# Patient Record
Sex: FEMALE | Race: WHITE | Employment: FULL TIME | URBAN - NONMETROPOLITAN AREA
[De-identification: names, ages, dates, MRNs, and addresses within clinical notes are randomized per-mention and may not be internally consistent; named-entity substitution may affect disease eponyms.]

---

## 2017-05-19 ENCOUNTER — OFFICE VISIT (OUTPATIENT)
Dept: URGENT CARE | Age: 27
End: 2017-05-19

## 2017-05-19 VITALS
SYSTOLIC BLOOD PRESSURE: 158 MMHG | DIASTOLIC BLOOD PRESSURE: 94 MMHG | HEART RATE: 110 BPM | HEIGHT: 64 IN | TEMPERATURE: 98.7 F | OXYGEN SATURATION: 99 % | BODY MASS INDEX: 26.8 KG/M2 | WEIGHT: 157 LBS | RESPIRATION RATE: 20 BRPM

## 2017-05-19 DIAGNOSIS — J20.9 ACUTE BRONCHITIS, UNSPECIFIED ORGANISM: Primary | ICD-10-CM

## 2017-05-19 PROCEDURE — 96372 THER/PROPH/DIAG INJ SC/IM: CPT | Performed by: PHYSICIAN ASSISTANT

## 2017-05-19 PROCEDURE — 99202 OFFICE O/P NEW SF 15 MIN: CPT | Performed by: PHYSICIAN ASSISTANT

## 2017-05-19 RX ORDER — LEVOFLOXACIN 500 MG/1
500 TABLET, FILM COATED ORAL DAILY
Qty: 10 TABLET | Refills: 0 | Status: SHIPPED | OUTPATIENT
Start: 2017-05-19 | End: 2017-05-29

## 2017-05-19 RX ORDER — DEXAMETHASONE SODIUM PHOSPHATE 10 MG/ML
10 INJECTION INTRAMUSCULAR; INTRAVENOUS ONCE
Status: COMPLETED | OUTPATIENT
Start: 2017-05-19 | End: 2017-05-19

## 2017-05-19 RX ORDER — LISINOPRIL 5 MG/1
5 TABLET ORAL DAILY
Qty: 30 TABLET | Refills: 0 | Status: SHIPPED | OUTPATIENT
Start: 2017-05-19 | End: 2018-10-24 | Stop reason: SDUPTHER

## 2017-05-19 RX ORDER — ALBUTEROL SULFATE 90 UG/1
2 AEROSOL, METERED RESPIRATORY (INHALATION) EVERY 6 HOURS PRN
Qty: 1 INHALER | Refills: 3 | Status: SHIPPED | OUTPATIENT
Start: 2017-05-19

## 2017-05-19 RX ADMIN — DEXAMETHASONE SODIUM PHOSPHATE 10 MG: 10 INJECTION INTRAMUSCULAR; INTRAVENOUS at 15:54

## 2017-05-19 ASSESSMENT — ENCOUNTER SYMPTOMS
SINUS PRESSURE: 1
NAUSEA: 0
DIARRHEA: 0
WHEEZING: 0
ABDOMINAL PAIN: 0
SORE THROAT: 0
RHINORRHEA: 1
SHORTNESS OF BREATH: 0
VOMITING: 0
COUGH: 1

## 2018-10-24 ENCOUNTER — OFFICE VISIT (OUTPATIENT)
Dept: URGENT CARE | Age: 28
End: 2018-10-24

## 2018-10-24 VITALS
OXYGEN SATURATION: 96 % | WEIGHT: 164 LBS | HEART RATE: 103 BPM | DIASTOLIC BLOOD PRESSURE: 82 MMHG | BODY MASS INDEX: 28.15 KG/M2 | RESPIRATION RATE: 18 BRPM | SYSTOLIC BLOOD PRESSURE: 128 MMHG

## 2018-10-24 DIAGNOSIS — I10 ESSENTIAL HYPERTENSION: Primary | ICD-10-CM

## 2018-10-24 PROCEDURE — 99213 OFFICE O/P EST LOW 20 MIN: CPT | Performed by: NURSE PRACTITIONER

## 2018-10-24 RX ORDER — LISINOPRIL 5 MG/1
5 TABLET ORAL DAILY
Qty: 30 TABLET | Refills: 0 | Status: SHIPPED | OUTPATIENT
Start: 2018-10-24 | End: 2021-11-02

## 2018-10-24 ASSESSMENT — ENCOUNTER SYMPTOMS
SINUS PRESSURE: 0
CONSTIPATION: 0
EYE REDNESS: 0
GASTROINTESTINAL NEGATIVE: 1
WHEEZING: 0
RESPIRATORY NEGATIVE: 1
TROUBLE SWALLOWING: 0
SORE THROAT: 0
ALLERGIC/IMMUNOLOGIC NEGATIVE: 1
SHORTNESS OF BREATH: 0
ABDOMINAL DISTENTION: 0
EYES NEGATIVE: 1
ABDOMINAL PAIN: 0

## 2018-10-24 NOTE — PROGRESS NOTES
and intact. No cyanosis. Psychiatric: She has a normal mood and affect. Her behavior is normal. Judgment and thought content normal.   Nursing note and vitals reviewed. /82   Pulse 103   Resp 18   Wt 164 lb (74.4 kg)   SpO2 96%   BMI 28.15 kg/m²     Assessment:       Diagnosis Orders   1. Essential hypertension  lisinopril (PRINIVIL;ZESTRIL) 5 MG tablet       Plan:    No orders of the defined types were placed in this encounter. Return if symptoms worsen or fail to improve. No orders of the defined types were placed in this encounter. Orders Placed This Encounter   Medications    lisinopril (PRINIVIL;ZESTRIL) 5 MG tablet     Sig: Take 1 tablet by mouth daily     Dispense:  30 tablet     Refill:  0       Patient given educationalmaterials - see patient instructions. Discussed use, benefit, and side effectsof prescribed medications. All patient questions answered. Pt voiced understanding. Reviewed health maintenance. Instructed to continue current medications, diet andexercise. Patient agreed with treatment plan. Follow up as directed. Patient Instructions   1. Take medication as directed  2. Follow up with PCP on Oct 30  3.  Return to clinic as needed        Electronically signed by DONATO Arias CNP on 10/24/2018 at 6:28 PM

## 2019-01-21 ENCOUNTER — OFFICE VISIT (OUTPATIENT)
Dept: URGENT CARE | Age: 29
End: 2019-01-21

## 2019-01-21 VITALS
WEIGHT: 167.6 LBS | TEMPERATURE: 99 F | DIASTOLIC BLOOD PRESSURE: 100 MMHG | SYSTOLIC BLOOD PRESSURE: 166 MMHG | HEART RATE: 122 BPM | HEIGHT: 64 IN | OXYGEN SATURATION: 98 % | RESPIRATION RATE: 18 BRPM | BODY MASS INDEX: 28.61 KG/M2

## 2019-01-21 DIAGNOSIS — H65.06 RECURRENT ACUTE SEROUS OTITIS MEDIA OF BOTH EARS: Primary | ICD-10-CM

## 2019-01-21 PROCEDURE — 99213 OFFICE O/P EST LOW 20 MIN: CPT | Performed by: NURSE PRACTITIONER

## 2019-01-21 RX ORDER — FLUTICASONE PROPIONATE 50 MCG
2 SPRAY, SUSPENSION (ML) NASAL DAILY
Qty: 3 BOTTLE | Refills: 1 | Status: SHIPPED | OUTPATIENT
Start: 2019-01-21

## 2019-01-21 RX ORDER — AZITHROMYCIN 250 MG/1
250 TABLET, FILM COATED ORAL SEE ADMIN INSTRUCTIONS
Qty: 6 TABLET | Refills: 0 | Status: SHIPPED | OUTPATIENT
Start: 2019-01-21 | End: 2019-01-26

## 2019-01-21 ASSESSMENT — ENCOUNTER SYMPTOMS
RESPIRATORY NEGATIVE: 1
ABDOMINAL PAIN: 0
WHEEZING: 0
SINUS PRESSURE: 0
COUGH: 0
SORE THROAT: 0
VOMITING: 0
ALLERGIC/IMMUNOLOGIC NEGATIVE: 1
CONSTIPATION: 0
EYES NEGATIVE: 1
TROUBLE SWALLOWING: 0
SHORTNESS OF BREATH: 0
EYE REDNESS: 0
RHINORRHEA: 0
GASTROINTESTINAL NEGATIVE: 1
ABDOMINAL DISTENTION: 0

## 2021-10-30 ENCOUNTER — OFFICE VISIT (OUTPATIENT)
Dept: URGENT CARE | Age: 31
End: 2021-10-30
Payer: COMMERCIAL

## 2021-10-30 VITALS
HEIGHT: 63 IN | WEIGHT: 138 LBS | HEART RATE: 138 BPM | OXYGEN SATURATION: 100 % | BODY MASS INDEX: 24.45 KG/M2 | SYSTOLIC BLOOD PRESSURE: 150 MMHG | DIASTOLIC BLOOD PRESSURE: 99 MMHG | TEMPERATURE: 98.3 F

## 2021-10-30 DIAGNOSIS — I88.9 ADENITIS: Primary | ICD-10-CM

## 2021-10-30 DIAGNOSIS — F41.9 ANXIETY: ICD-10-CM

## 2021-10-30 DIAGNOSIS — J02.9 SORE THROAT: ICD-10-CM

## 2021-10-30 LAB — S PYO AG THROAT QL: NORMAL

## 2021-10-30 PROCEDURE — 87880 STREP A ASSAY W/OPTIC: CPT | Performed by: NURSE PRACTITIONER

## 2021-10-30 PROCEDURE — 99213 OFFICE O/P EST LOW 20 MIN: CPT | Performed by: NURSE PRACTITIONER

## 2021-10-30 RX ORDER — SULFAMETHOXAZOLE AND TRIMETHOPRIM 800; 160 MG/1; MG/1
1 TABLET ORAL 2 TIMES DAILY
Qty: 14 TABLET | Refills: 0 | Status: SHIPPED | OUTPATIENT
Start: 2021-10-30 | End: 2021-11-06

## 2021-10-30 ASSESSMENT — ENCOUNTER SYMPTOMS
CHEST TIGHTNESS: 0
COUGH: 0
ABDOMINAL PAIN: 0
NAUSEA: 0
DIARRHEA: 0
VOMITING: 0
SORE THROAT: 1
COLOR CHANGE: 0
SHORTNESS OF BREATH: 0

## 2021-10-30 NOTE — PROGRESS NOTES
ChiefComplaint:   Chief Complaint   Patient presents with    Pharyngitis     x1 week        History of Present Illness:  Finn Mireles is a 33 y.o. female who presents for evaluation of sore throat x 1 week. She reports the right side of her throat is tender to touch and it hurts to swallow. She reports a piece of something white, came out of her mouth earlier this week from the right side of her throat. Patient reports she has had trouble with her blood pressure for the last few months. She reports her anxiety has worsened and she feels this is due to her high stress job. She took hctz 12.5 mg and lisinopril 5 mg and her blood pressure bottomed out to 90/60 so she quit taking it. Since then, her blood pressure has been in the 120's/80's without concern. She reports she has white coat syndrome and that is why her blood pressure is elevated today. She also reports she has flushing in her neck, face and chest. She reports her right ear has been popping off and on for the last month. She denies any fevers but has a decreased appetite. History:  Past Medical History:   Diagnosis Date    Hypertension        No family history on file.   Social History     Socioeconomic History    Marital status:      Spouse name: Not on file    Number of children: Not on file    Years of education: Not on file    Highest education level: Not on file   Occupational History    Not on file   Tobacco Use    Smoking status: Never Smoker    Smokeless tobacco: Never Used   Vaping Use    Vaping Use: Never used   Substance and Sexual Activity    Alcohol use: No    Drug use: No    Sexual activity: Not on file   Other Topics Concern    Not on file   Social History Narrative    Not on file     Social Determinants of Health     Financial Resource Strain:     Difficulty of Paying Living Expenses:    Food Insecurity:     Worried About Running Out of Food in the Last Year:     [address] in the Last Year: Transportation Needs:     Lack of Transportation (Medical):  Lack of Transportation (Non-Medical):    Physical Activity:     Days of Exercise per Week:     Minutes of Exercise per Session:    Stress:     Feeling of Stress :    Social Connections:     Frequency of Communication with Friends and Family:     Frequency of Social Gatherings with Friends and Family:     Attends Scient[employer] Services:     Active Member of Clubs or Organizations:     Attends Club or Organization Meetings:     Marital Status:    Intimate Partner Violence:     Fear of Current or Ex-Partner:     Emotionally Abused:     Physically Abused:     Sexually Abused: Allergies: Allergies   Allergen Reactions    Amoxicillin     Erythromycin     Keflex [Cephalexin]     Pcn [Penicillins] Other (See Comments)     Unknown      Propranolol Other (See Comments)     Cant breathe    Zithromax [Azithromycin]        Medications:  Current Outpatient Medications on File Prior to Visit   Medication Sig Dispense Refill    Norethindrone (ORTHO MICRONOR PO) Take by mouth      fluticasone (FLONASE) 50 MCG/ACT nasal spray 2 sprays by Each Nare route daily (Patient not taking: Reported on 10/30/2021) 3 Bottle 1    lisinopril (PRINIVIL;ZESTRIL) 5 MG tablet Take 1 tablet by mouth daily (Patient not taking: Reported on 10/30/2021) 30 tablet 0    albuterol sulfate HFA (PROAIR HFA) 108 (90 BASE) MCG/ACT inhaler Inhale 2 puffs into the lungs every 6 hours as needed for Wheezing (Patient not taking: Reported on 10/30/2021) 1 Inhaler 3     No current facility-administered medications on file prior to visit. Review of Systems:  Review of Systems   Constitutional: Positive for appetite change and fatigue. Negative for activity change and fever. HENT: Positive for sore throat. Negative for congestion and ear pain. Respiratory: Negative for cough, chest tightness and shortness of breath. Cardiovascular: Negative for chest pain. sulfamethoxazole-trimethoprim (BACTRIM DS;SEPTRA DS) 800-160 MG per tablet; Take 1 tablet by mouth 2 times daily for 7 days  Dispense: 14 tablet; Refill: 0    2. Sore throat    - POCT rapid strep A     3. Anxiety  Encouraged patient to follow up with her primary care provider to further discuss anxiety and treatment. Return if symptoms worsen or fail to improve.

## 2021-11-02 ENCOUNTER — OFFICE VISIT (OUTPATIENT)
Dept: PRIMARY CARE CLINIC | Age: 31
End: 2021-11-02
Payer: COMMERCIAL

## 2021-11-02 VITALS
RESPIRATION RATE: 16 BRPM | DIASTOLIC BLOOD PRESSURE: 82 MMHG | BODY MASS INDEX: 23.82 KG/M2 | HEIGHT: 64 IN | TEMPERATURE: 97.7 F | HEART RATE: 89 BPM | WEIGHT: 139.5 LBS | OXYGEN SATURATION: 97 % | SYSTOLIC BLOOD PRESSURE: 154 MMHG

## 2021-11-02 DIAGNOSIS — F41.9 ANXIETY: Primary | ICD-10-CM

## 2021-11-02 DIAGNOSIS — I10 PRIMARY HYPERTENSION: ICD-10-CM

## 2021-11-02 PROCEDURE — 99204 OFFICE O/P NEW MOD 45 MIN: CPT | Performed by: FAMILY MEDICINE

## 2021-11-02 RX ORDER — LISINOPRIL 5 MG/1
5 TABLET ORAL DAILY
Qty: 30 TABLET | Refills: 5 | Status: SHIPPED | OUTPATIENT
Start: 2021-11-02

## 2021-11-02 RX ORDER — BUSPIRONE HYDROCHLORIDE 5 MG/1
5 TABLET ORAL 3 TIMES DAILY PRN
Qty: 90 TABLET | Refills: 0 | Status: SHIPPED | OUTPATIENT
Start: 2021-11-02 | End: 2021-12-02

## 2021-11-02 RX ORDER — CETIRIZINE HYDROCHLORIDE 10 MG/1
10 TABLET ORAL DAILY
COMMUNITY

## 2021-11-02 ASSESSMENT — PATIENT HEALTH QUESTIONNAIRE - PHQ9
SUM OF ALL RESPONSES TO PHQ9 QUESTIONS 1 & 2: 0
SUM OF ALL RESPONSES TO PHQ QUESTIONS 1-9: 0
2. FEELING DOWN, DEPRESSED OR HOPELESS: 0
1. LITTLE INTEREST OR PLEASURE IN DOING THINGS: 0

## 2021-11-03 ENCOUNTER — TELEPHONE (OUTPATIENT)
Dept: PRIMARY CARE CLINIC | Age: 31
End: 2021-11-03

## 2021-11-03 RX ORDER — CLINDAMYCIN HYDROCHLORIDE 300 MG/1
300 CAPSULE ORAL 2 TIMES DAILY
Qty: 14 CAPSULE | Refills: 0 | Status: SHIPPED | OUTPATIENT
Start: 2021-11-03 | End: 2021-11-10

## 2021-11-03 RX ORDER — PREDNISONE 10 MG/1
TABLET ORAL
Qty: 21 TABLET | Refills: 0 | Status: SHIPPED | OUTPATIENT
Start: 2021-11-03

## 2021-11-03 NOTE — TELEPHONE ENCOUNTER
Pt states she is on her 4th day of antibiotics and today after lunch she noticed that her throat down to her esophagus is swollen. She is finding it difficult to swallow and to breathe. She is wanting to know if she needs to changed antiinflammatories or antibiotics.

## 2021-11-03 NOTE — TELEPHONE ENCOUNTER
If she is having difficulty swallowing and breathing she needs to go to the ER immediately if she could be having an allergic reaction.

## 2021-11-03 NOTE — TELEPHONE ENCOUNTER
If it is just that one side that was bothering her before and she is not having difficulty breathing I could try sending in some steroids over and maybe switch her antibiotic to see if that would make a difference. Find out if she would like for me to do this and let me know and I will send it to the pharmacy.

## 2021-11-03 NOTE — TELEPHONE ENCOUNTER
Pt states it was only on the side with the infection. She states it is better now. She states she didn't have anything different or new for lunch.

## 2021-11-10 ASSESSMENT — ENCOUNTER SYMPTOMS
COLOR CHANGE: 0
ABDOMINAL PAIN: 0
VOMITING: 0
EYE DISCHARGE: 0
DIARRHEA: 0
BACK PAIN: 0
COUGH: 0
WHEEZING: 0
NAUSEA: 0

## 2021-11-10 NOTE — PROGRESS NOTES
SUBJECTIVE:    Patient ID: Finn Mireles is a 33 y.o. female. HPI:     Patient is seen today to establish care. She states that she thinks that she may have some anxiety going on. She states her blood pressures been running high at times and she feels like that that may be coming from anxiety. She states that she has been on blood pressure medication previously but she states that she started back on the medications that she was taking previously and her blood pressure bottomed out and she felt terrible so she is not sure that she needs blood pressure medication all the time. She states that she does not necessarily feel overly anxious all the time but does have occasional episodes where she gets stressed and will get anxious. She states that she is not having any chest pain but will occasionally have a few palpitations. She states that it is not anything that caused her to be symptomatic. She states that there is no family history of high blood pressure at an early age. She states that she does feel like she eats pretty healthy and drinks plenty of water during the day. Past Medical History:   Diagnosis Date    Hypertension      Current Outpatient Medications on File Prior to Visit   Medication Sig Dispense Refill    cetirizine (ZYRTEC ALLERGY) 10 MG tablet Take 10 mg by mouth daily      fluticasone (FLONASE) 50 MCG/ACT nasal spray 2 sprays by Each Nare route daily 3 Bottle 1    albuterol sulfate HFA (PROAIR HFA) 108 (90 BASE) MCG/ACT inhaler Inhale 2 puffs into the lungs every 6 hours as needed for Wheezing 1 Inhaler 3    Norethindrone (ORTHO MICRONOR PO) Take by mouth       No current facility-administered medications on file prior to visit.      Allergies   Allergen Reactions    Amoxicillin     Erythromycin     Keflex [Cephalexin]     Pcn [Penicillins] Other (See Comments)     Unknown      Propranolol Other (See Comments)     Cant breathe    Zithromax [Azithromycin]      Past Surgical History:   Procedure Laterality Date     SECTION  [id number]    X's 2     Family History   Problem Relation Age of Onset    High Blood Pressure Mother     Other Father         Cardiovascualr Disease    High Blood Pressure Father      Social History     Socioeconomic History    Marital status:      Spouse name: Not on file    Number of children: Not on file    Years of education: Not on file    Highest education level: Not on file   Occupational History    Not on file   Tobacco Use    Smoking status: Never Smoker    Smokeless tobacco: Never Used   Vaping Use    Vaping Use: Never used   Substance and Sexual Activity    Alcohol use: No    Drug use: No    Sexual activity: Not on file   Other Topics Concern    Not on file   Social History Narrative    Not on file     Social Determinants of Health     Financial Resource Strain:     Difficulty of Paying Living Expenses: Not on file   Food Insecurity:     Worried About Running Out of Food in the Last Year: Not on file    Nikole of Food in the Last Year: Not on file   Transportation Needs:     Lack of Transportation (Medical): Not on file    Lack of Transportation (Non-Medical):  Not on file   Physical Activity:     Days of Exercise per Week: Not on file    Minutes of Exercise per Session: Not on file   Stress:     Feeling of Stress : Not on file   Social Connections:     Frequency of Communication with Friends and Family: Not on file    Frequency of Social Gatherings with Friends and Family: Not on file    Attends Latter day Services: Not on file    Active Member of Clubs or Organizations: Not on file    Attends Club or Organization Meetings: Not on file    Marital Status: Not on file   Intimate Partner Violence:     Fear of Current or Ex-Partner: Not on file    Emotionally Abused: Not on file    Physically Abused: Not on file    Sexually Abused: Not on file   Housing Stability:     Unable to Pay for Housing in the Last Year: Not on file    Number of Places Lived in the Last Year: Not on file    Unstable Housing in the Last Year: Not on file       Review of Systems   Constitutional: Negative for activity change, appetite change and fever. HENT: Negative for congestion and nosebleeds. Eyes: Negative for discharge. Respiratory: Negative for cough and wheezing. Cardiovascular: Negative for chest pain and leg swelling. Gastrointestinal: Negative for abdominal pain, diarrhea, nausea and vomiting. Genitourinary: Negative for difficulty urinating, frequency and urgency. Musculoskeletal: Negative for back pain and gait problem. Skin: Negative for color change and rash. Neurological: Negative for dizziness and headaches. Hematological: Does not bruise/bleed easily. Psychiatric/Behavioral: Negative for sleep disturbance and suicidal ideas. The patient is nervous/anxious. OBJECTIVE:    Physical Exam  Vitals reviewed. Constitutional:       General: She is not in acute distress. Appearance: Normal appearance. She is well-developed. She is not diaphoretic. HENT:      Head: Normocephalic and atraumatic. Right Ear: External ear normal.      Left Ear: External ear normal.   Cardiovascular:      Rate and Rhythm: Normal rate and regular rhythm. Pulses: Normal pulses. Heart sounds: Normal heart sounds. No murmur heard. Pulmonary:      Effort: Pulmonary effort is normal. No respiratory distress. Breath sounds: Normal breath sounds. Abdominal:      General: Abdomen is flat. Bowel sounds are normal.      Palpations: Abdomen is soft. Tenderness: There is no abdominal tenderness. Musculoskeletal:      Cervical back: Normal range of motion and neck supple. Skin:     General: Skin is warm and dry. Neurological:      General: No focal deficit present. Mental Status: She is alert and oriented to person, place, and time. Mental status is at baseline.    Psychiatric:         Mood and Affect: Mood normal.         Behavior: Behavior normal.         Thought Content: Thought content normal.         Judgment: Judgment normal.        BP (!) 154/82   Pulse 89   Temp 97.7 °F (36.5 °C) (Temporal)   Resp 16   Ht 5' 4\" (1.626 m)   Wt 139 lb 8 oz (63.3 kg)   SpO2 97%   BMI 23.95 kg/m²      ASSESSMENT/PLAN:    Bhupinder Green was seen today for new patient, anxiety and hypertension. Diagnoses and all orders for this visit:    Anxiety    Primary hypertension    Other orders  -     lisinopril (PRINIVIL;ZESTRIL) 5 MG tablet; Take 1 tablet by mouth daily  -     busPIRone (BUSPAR) 5 MG tablet; Take 1 tablet by mouth 3 times daily as needed (anxiety)      Discussed with the patient today that I do feel like some of her symptoms may be stemming from anxiety so we are going to start BuSpar to see if this will help with the anxiety. I am also going to have her start back on 5 mg lisinopril without the hydrochlorothiazide and see how she does with this. I did discuss that she could also try doing a half a tablet of lisinopril if she would like if she feels like the 5 is too much. I would like to see her back in 4 weeks for recheck to see how she is doing. I would like for her to monitor her blood pressures at home and bring her log with her when she comes into the office. EMR Dragon/transcription disclaimer:  Much of this encounter note is electronictranscription/translation of spoken language to printed texts. The electronic translation of spoken language may be erroneous, or at times, nonsensical words or phrases may be inadvertently transcribed.   Although I havereviewed the note for such errors, some may still exist.

## 2021-12-30 ENCOUNTER — HOSPITAL ENCOUNTER (OUTPATIENT)
Dept: VASCULAR LAB | Age: 31
Discharge: HOME | End: 2021-12-30
Payer: COMMERCIAL

## 2021-12-30 DIAGNOSIS — M79.605 LEFT LEG PAIN: ICD-10-CM

## 2021-12-30 PROCEDURE — 93971 EXTREMITY STUDY: CPT

## 2022-01-03 ENCOUNTER — OFFICE VISIT (OUTPATIENT)
Dept: ENT CLINIC | Age: 32
End: 2022-01-03
Payer: COMMERCIAL

## 2022-01-03 VITALS — HEIGHT: 64 IN | WEIGHT: 126 LBS | BODY MASS INDEX: 21.51 KG/M2

## 2022-01-03 DIAGNOSIS — Z90.89 S/P TONSILLECTOMY: Primary | ICD-10-CM

## 2022-01-03 PROCEDURE — 99203 OFFICE O/P NEW LOW 30 MIN: CPT | Performed by: PHYSICIAN ASSISTANT

## 2022-01-03 RX ORDER — BUSPIRONE HYDROCHLORIDE 5 MG/1
10 TABLET ORAL 3 TIMES DAILY
COMMUNITY

## 2022-01-03 ASSESSMENT — ENCOUNTER SYMPTOMS
RHINORRHEA: 0
SINUS PRESSURE: 0
VOICE CHANGE: 0
SINUS PAIN: 0
EYE DISCHARGE: 0
EYE PAIN: 0
FACIAL SWELLING: 0
TROUBLE SWALLOWING: 0
SORE THROAT: 0

## 2022-01-03 NOTE — ASSESSMENT & PLAN NOTE
Doing well status post tonsillectomy 6 weeks ago with cauterization of postop hemorrhage to right tonsillar bed 3 weeks ago  Plan: I recommended compounded lidocaine lollipops for the discomfort. This was called into [address] She is to follow-up with me in 3 to 4 weeks for routine postop follow-up due to her surgeon living in [place]. She was advised that she can advance her diet to a regular diet starting next week.

## 2022-01-03 NOTE — PROGRESS NOTES
[facility] OTOLARYNGOLOGY/ENT  Obdulio Gutiérrez is a pleasant 78-year-old  female that was referred by Olimpia Sung due to a recent tonsillectomy that was performed in [place]. She reported that she is currently 3 weeks out from post cauterization of a postop bleed to the right tonsillar region. Since then she has had no issues and has been doing well. She reports that she had her surgery performed in [place] due to coverage as a . Currently she reports of soreness from the right tonsillar region and is having issues with pain control with Chloraseptic spray. She was given a trial of Magic mouthwash that has not helped. She denies any issues with bleeding is currently been maintained on a soft diet. Allergies: Amoxicillin, Erythromycin, Keflex [cephalexin], Pcn [penicillins], Propranolol, and Zithromax [azithromycin]      Current Outpatient Medications   Medication Sig Dispense Refill    busPIRone (BUSPAR) 5 MG tablet Take 5 mg by mouth 3 times daily      albuterol sulfate HFA (PROAIR HFA) 108 (90 BASE) MCG/ACT inhaler Inhale 2 puffs into the lungs every 6 hours as needed for Wheezing 1 Inhaler 3    Norethindrone (ORTHO MICRONOR PO) Take by mouth      predniSONE (DELTASONE) 10 MG tablet Take 6 tablets on day 1, 5 tablets on day 2, 4 tablets on day 3, 3 tablets on day 4, 2 tablets on day 5 and 1 tablet on day 6 (Patient not taking: Reported on 1/3/2022) 21 tablet 0    cetirizine (ZYRTEC ALLERGY) 10 MG tablet Take 10 mg by mouth daily (Patient not taking: Reported on 1/3/2022)      lisinopril (PRINIVIL;ZESTRIL) 5 MG tablet Take 1 tablet by mouth daily (Patient not taking: Reported on 1/3/2022) 30 tablet 5    fluticasone (FLONASE) 50 MCG/ACT nasal spray 2 sprays by Each Nare route daily (Patient not taking: Reported on 1/3/2022) 3 Bottle 1     No current facility-administered medications for this visit.        Past Surgical History:   Procedure Laterality Date     SECTION [id number]    X's 2       Past Medical History:   Diagnosis Date    Hypertension        Family History   Problem Relation Age of Onset    High Blood Pressure Mother     Other Father         Cardiovascualr Disease    High Blood Pressure Father        Social History     Tobacco Use    Smoking status: Never Smoker    Smokeless tobacco: Never Used   Substance Use Topics    Alcohol use: No           REVIEW OF SYSTEMS:  all other systems reviewed and are negative  Review of Systems   Constitutional: Negative for chills and fever. HENT: Negative for congestion, dental problem, ear discharge, ear pain, facial swelling, hearing loss, nosebleeds, postnasal drip, rhinorrhea, sinus pressure, sinus pain, sneezing, sore throat, tinnitus, trouble swallowing and voice change. Eyes: Negative for pain and discharge. Neurological: Negative for dizziness and headaches. Comments:     PHYSICAL EXAM:    Ht 5' 4\" (1.626 m)   Wt 126 lb (57.2 kg)   BMI 21.63 kg/m²   Body mass index is 21.63 kg/m². General Appearance: well developed  and well nourished  Head/ Face: normocephalic and atraumatic  Vocal Quality: good/ normal  Ears: Right Ear: External: external ears normal Otoscopy Ear Canal: canal clear Otoscopy TM: TM's normal and TM's mobile Left Ear: External: external ears normal Otoscopy Ear Canal: canal clear Otoscopy TM: TM's normal and TM's mobile  Hearing: grossly intact  Nose: nares normal and septum midline  Neck: supple and adenopathy none palpable  Thyroid: normal   Oral exam demonstrated the tonsillar bed to be healing well with no evidence of scabs noted be present physically to the right side. Granulation tissue was noted to be present with no purulence.      Assessment & Plan:    Problem List Items Addressed This Visit     S/P tonsillectomy - Primary     Doing well status post tonsillectomy 6 weeks ago with cauterization of postop hemorrhage to right tonsillar bed 3 weeks ago  Plan: I recommended compounded lidocaine lollipops for the discomfort. This was called into [address] She is to follow-up with me in 3 to 4 weeks for routine postop follow-up due to her surgeon living in [place]. She was advised that she can advance her diet to a regular diet starting next week. No orders of the defined types were placed in this encounter. No orders of the defined types were placed in this encounter. Electronically signed by Christopher Hernandez PA-C on 1/3/22 at 10:16 AM CST          Please note that this chart was generated using dragon dictation software. Although every effort was made to ensure the accuracy of this automated transcription, some errors in transcription may have occurred.

## 2022-01-28 ENCOUNTER — OFFICE VISIT (OUTPATIENT)
Dept: ENT CLINIC | Age: 32
End: 2022-01-28
Payer: COMMERCIAL

## 2022-01-28 VITALS — WEIGHT: 126 LBS | BODY MASS INDEX: 21.51 KG/M2 | HEIGHT: 64 IN

## 2022-01-28 DIAGNOSIS — Z90.89 S/P TONSILLECTOMY: Primary | ICD-10-CM

## 2022-01-28 PROCEDURE — 99214 OFFICE O/P EST MOD 30 MIN: CPT | Performed by: PHYSICIAN ASSISTANT

## 2022-01-28 NOTE — PROGRESS NOTES
Ms. Arnoldo Amanda is a pleasant 58-year-old  female that presents for a 1 month follow-up after having a tonsillectomy 8 weeks ago in [place]. Patient reports that she feels much improved with no tenderness from the bed of the tonsillar site. She reports that the lidocaine lollipops did not relieve her pain due to this localizing to the wrong spots. She complains of discomfort to the right anterior cervical region and has noticed this to worsen postprandially. She has a history of reflux but has not been taking any medications. She denies any issues with dysphagia or vocal hoarseness. Physical examination revealed the patient to have a normal TM and canal bilaterally. Oral exam demonstrated the tonsillar bed to be healed. The tongue demonstrated no evidence of thrush. Neck exam demonstrated some mild tenderness to the right anterior cervical region with no lymphadenopathy or thyromegaly present. Impression: Doing well status post tonsillectomy 8 weeks ago, right anterior cervical pain questionably secondary to reflux disease. Plan: I offered to do a laryngoscopy today however the patient would like to wait until this worsens. I have recommended for her to do a trial of omeprazole twice a day for a month to see if this helps with her symptoms. At this point she is to follow-up with me as needed. She is reminded to call if she has any questions or problems.       Electronically signed by Jeremy Robin PA-C on 1/29/22 at 1:29 PM CST

## 2022-12-13 ENCOUNTER — APPOINTMENT (OUTPATIENT)
Dept: GENERAL RADIOLOGY | Facility: HOSPITAL | Age: 32
End: 2022-12-13

## 2022-12-13 ENCOUNTER — HOSPITAL ENCOUNTER (EMERGENCY)
Facility: HOSPITAL | Age: 32
Discharge: HOME | End: 2022-12-13
Admitting: FAMILY MEDICINE

## 2022-12-13 VITALS
BODY MASS INDEX: 26.5 KG/M2 | DIASTOLIC BLOOD PRESSURE: 70 MMHG | SYSTOLIC BLOOD PRESSURE: 121 MMHG | RESPIRATION RATE: 15 BRPM | OXYGEN SATURATION: 98 % | HEART RATE: 88 BPM | HEIGHT: 60 IN | WEIGHT: 135 LBS | TEMPERATURE: 98.8 F

## 2022-12-13 DIAGNOSIS — Z77.098 CHEMICAL EXPOSURE: Primary | ICD-10-CM

## 2022-12-13 DIAGNOSIS — R00.0 TACHYCARDIA: ICD-10-CM

## 2022-12-13 LAB
A-A DO2: 7.2 MMHG
ALBUMIN SERPL-MCNC: 4.9 G/DL (ref 3.5–5.2)
ALBUMIN/GLOB SERPL: 1.8 G/DL
ALP SERPL-CCNC: 7 U/L (ref 39–117)
ALT SERPL W P-5'-P-CCNC: 16 U/L (ref 1–33)
AMPHET+METHAMPHET UR QL: NEGATIVE
AMPHETAMINES UR QL: NEGATIVE
ANION GAP SERPL CALCULATED.3IONS-SCNC: 10 MMOL/L (ref 5–15)
ARTERIAL PATENCY WRIST A: POSITIVE
AST SERPL-CCNC: 19 U/L (ref 1–32)
ATMOSPHERIC PRESS: 746 MMHG
BARBITURATES UR QL SCN: NEGATIVE
BASE EXCESS BLDA CALC-SCNC: 0.6 MMOL/L (ref 0–2)
BASOPHILS # BLD AUTO: 0.03 10*3/MM3 (ref 0–0.2)
BASOPHILS NFR BLD AUTO: 0.3 % (ref 0–1.5)
BDY SITE: ABNORMAL
BENZODIAZ UR QL SCN: NEGATIVE
BILIRUB SERPL-MCNC: <0.2 MG/DL (ref 0–1.2)
BILIRUB UR QL STRIP: NEGATIVE
BODY TEMPERATURE: 37 C
BUN SERPL-MCNC: 12 MG/DL (ref 6–20)
BUN/CREAT SERPL: 22.6 (ref 7–25)
BUPRENORPHINE SERPL-MCNC: NEGATIVE NG/ML
CALCIUM SPEC-SCNC: 9.5 MG/DL (ref 8.6–10.5)
CANNABINOIDS SERPL QL: NEGATIVE
CHLORIDE SERPL-SCNC: 101 MMOL/L (ref 98–107)
CLARITY UR: CLEAR
CO2 SERPL-SCNC: 24 MMOL/L (ref 22–29)
COCAINE UR QL: NEGATIVE
COHGB MFR BLD: 0.4 % (ref 0–5)
COLOR UR: YELLOW
CREAT SERPL-MCNC: 0.53 MG/DL (ref 0.57–1)
D DIMER PPP FEU-MCNC: 0.39 MCGFEU/ML (ref 0–0.5)
D-LACTATE SERPL-SCNC: 0.9 MMOL/L (ref 0.5–2)
DEPRECATED RDW RBC AUTO: 39.9 FL (ref 37–54)
EGFRCR SERPLBLD CKD-EPI 2021: 124.6 ML/MIN/1.73
EOSINOPHIL # BLD AUTO: 0.04 10*3/MM3 (ref 0–0.4)
EOSINOPHIL NFR BLD AUTO: 0.4 % (ref 0.3–6.2)
ERYTHROCYTE [DISTWIDTH] IN BLOOD BY AUTOMATED COUNT: 11.7 % (ref 12.3–15.4)
ETHANOL UR QL: <0.01 %
GLOBULIN UR ELPH-MCNC: 2.8 GM/DL
GLUCOSE SERPL-MCNC: 118 MG/DL (ref 65–99)
GLUCOSE UR STRIP-MCNC: NEGATIVE MG/DL
HCG SERPL QL: NEGATIVE
HCO3 BLDA-SCNC: 23 MMOL/L (ref 20–26)
HCT VFR BLD AUTO: 39.3 % (ref 34–46.6)
HCT VFR BLD CALC: 41.1 % (ref 38–51)
HGB BLD-MCNC: 12.9 G/DL (ref 12–15.9)
HGB BLDA-MCNC: 13.4 G/DL (ref 12–16)
HGB UR QL STRIP.AUTO: NEGATIVE
IMM GRANULOCYTES # BLD AUTO: 0.06 10*3/MM3 (ref 0–0.05)
IMM GRANULOCYTES NFR BLD AUTO: 0.5 % (ref 0–0.5)
KETONES UR QL STRIP: NEGATIVE
LEUKOCYTE ESTERASE UR QL STRIP.AUTO: NEGATIVE
LYMPHOCYTES # BLD AUTO: 1.37 10*3/MM3 (ref 0.7–3.1)
LYMPHOCYTES NFR BLD AUTO: 12.1 % (ref 19.6–45.3)
Lab: ABNORMAL
MAGNESIUM SERPL-MCNC: 2 MG/DL (ref 1.6–2.6)
MCH RBC QN AUTO: 30.7 PG (ref 26.6–33)
MCHC RBC AUTO-ENTMCNC: 32.8 G/DL (ref 31.5–35.7)
MCV RBC AUTO: 93.6 FL (ref 79–97)
METHADONE UR QL SCN: NEGATIVE
METHGB BLD QL: 0.5 % (ref 0–3)
MODALITY: ABNORMAL
MONOCYTES # BLD AUTO: 0.36 10*3/MM3 (ref 0.1–0.9)
MONOCYTES NFR BLD AUTO: 3.2 % (ref 5–12)
NEUTROPHILS NFR BLD AUTO: 83.5 % (ref 42.7–76)
NEUTROPHILS NFR BLD AUTO: 9.48 10*3/MM3 (ref 1.7–7)
NITRITE UR QL STRIP: NEGATIVE
NOTE: ABNORMAL
NRBC BLD AUTO-RTO: 0 /100 WBC (ref 0–0.2)
OPIATES UR QL: NEGATIVE
OXYCODONE UR QL SCN: NEGATIVE
OXYHGB MFR BLDV: 96.7 % (ref 94–99)
PCO2 BLDA: 29.7 MM HG (ref 35–45)
PCO2 TEMP ADJ BLD: 29.7 MM HG (ref 35–45)
PCP UR QL SCN: NEGATIVE
PH BLDA: 7.5 PH UNITS (ref 7.35–7.45)
PH UR STRIP.AUTO: 6.5 [PH] (ref 5–8)
PH, TEMP CORRECTED: 7.5 PH UNITS (ref 7.35–7.45)
PLATELET # BLD AUTO: 280 10*3/MM3 (ref 140–450)
PMV BLD AUTO: 11.3 FL (ref 6–12)
PO2 BLDA: 106 MM HG (ref 83–108)
PO2 TEMP ADJ BLD: 106 MM HG (ref 83–108)
POTASSIUM BLDA-SCNC: 4.2 MMOL/L (ref 3.5–5.2)
POTASSIUM SERPL-SCNC: 4 MMOL/L (ref 3.5–5.2)
PROPOXYPH UR QL: NEGATIVE
PROT SERPL-MCNC: 7.7 G/DL (ref 6–8.5)
PROT UR QL STRIP: NEGATIVE
RBC # BLD AUTO: 4.2 10*6/MM3 (ref 3.77–5.28)
SAO2 % BLDCOA: 97.6 % (ref 94–99)
SODIUM BLDA-SCNC: 138 MMOL/L (ref 136–145)
SODIUM SERPL-SCNC: 135 MMOL/L (ref 136–145)
SP GR UR STRIP: 1.01 (ref 1–1.03)
T4 FREE SERPL-MCNC: 1.26 NG/DL (ref 0.93–1.7)
TRICYCLICS UR QL SCN: NEGATIVE
TROPONIN T SERPL-MCNC: <0.01 NG/ML (ref 0–0.03)
TSH SERPL DL<=0.05 MIU/L-ACNC: 1.73 UIU/ML (ref 0.27–4.2)
UROBILINOGEN UR QL STRIP: NORMAL
VENTILATOR MODE: ABNORMAL
WBC NRBC COR # BLD: 11.34 10*3/MM3 (ref 3.4–10.8)

## 2022-12-13 PROCEDURE — 99284 EMERGENCY DEPT VISIT MOD MDM: CPT

## 2022-12-13 PROCEDURE — 84703 CHORIONIC GONADOTROPIN ASSAY: CPT | Performed by: NURSE PRACTITIONER

## 2022-12-13 PROCEDURE — 80306 DRUG TEST PRSMV INSTRMNT: CPT | Performed by: NURSE PRACTITIONER

## 2022-12-13 PROCEDURE — 83605 ASSAY OF LACTIC ACID: CPT | Performed by: NURSE PRACTITIONER

## 2022-12-13 PROCEDURE — 80053 COMPREHEN METABOLIC PANEL: CPT | Performed by: NURSE PRACTITIONER

## 2022-12-13 PROCEDURE — 93005 ELECTROCARDIOGRAM TRACING: CPT | Performed by: EMERGENCY MEDICINE

## 2022-12-13 PROCEDURE — 82805 BLOOD GASES W/O2 SATURATION: CPT

## 2022-12-13 PROCEDURE — 83735 ASSAY OF MAGNESIUM: CPT | Performed by: NURSE PRACTITIONER

## 2022-12-13 PROCEDURE — 84443 ASSAY THYROID STIM HORMONE: CPT | Performed by: NURSE PRACTITIONER

## 2022-12-13 PROCEDURE — 85379 FIBRIN DEGRADATION QUANT: CPT | Performed by: NURSE PRACTITIONER

## 2022-12-13 PROCEDURE — 84484 ASSAY OF TROPONIN QUANT: CPT | Performed by: NURSE PRACTITIONER

## 2022-12-13 PROCEDURE — 82077 ASSAY SPEC XCP UR&BREATH IA: CPT | Performed by: NURSE PRACTITIONER

## 2022-12-13 PROCEDURE — 84439 ASSAY OF FREE THYROXINE: CPT | Performed by: NURSE PRACTITIONER

## 2022-12-13 PROCEDURE — 93010 ELECTROCARDIOGRAM REPORT: CPT | Performed by: INTERNAL MEDICINE

## 2022-12-13 PROCEDURE — 36415 COLL VENOUS BLD VENIPUNCTURE: CPT

## 2022-12-13 PROCEDURE — 81003 URINALYSIS AUTO W/O SCOPE: CPT | Performed by: NURSE PRACTITIONER

## 2022-12-13 PROCEDURE — 71045 X-RAY EXAM CHEST 1 VIEW: CPT

## 2022-12-13 PROCEDURE — 83050 HGB METHEMOGLOBIN QUAN: CPT

## 2022-12-13 PROCEDURE — 82375 ASSAY CARBOXYHB QUANT: CPT

## 2022-12-13 PROCEDURE — 85025 COMPLETE CBC W/AUTO DIFF WBC: CPT | Performed by: NURSE PRACTITIONER

## 2022-12-13 RX ORDER — SODIUM CHLORIDE 0.9 % (FLUSH) 0.9 %
10 SYRINGE (ML) INJECTION AS NEEDED
Status: DISCONTINUED | OUTPATIENT
Start: 2022-12-13 | End: 2022-12-14 | Stop reason: HOSPADM

## 2022-12-13 RX ADMIN — SODIUM CHLORIDE, POTASSIUM CHLORIDE, SODIUM LACTATE AND CALCIUM CHLORIDE 1000 ML: 600; 310; 30; 20 INJECTION, SOLUTION INTRAVENOUS at 21:12

## 2022-12-13 NOTE — ED NOTES
Attempted to start IV twice.  Patient pulls arm away both times and IV site is missed.  Patient states that it has taken up to 16 tries to start an IV on her in the past. Will ask different RN to try. Urine sent to lab at this time

## 2022-12-13 NOTE — ED NOTES
Spoke to Syeda with poison control. They state that the exposure with dry ice would be much like spray pain exposure and since they have been in our care for almost an hour there is nothing more that needs to be done and they are safe for discharge as long as they are not short of breath

## 2022-12-13 NOTE — ED PROVIDER NOTES
"Subjective   History of Present Illness  Patient is a very pleasant 32-year-old female presents ER today with complaint of shortness of breath, nausea and dizziness after exposure to dry eyes.  Patient reports that she was at work and was packing some meat into some containers to shift.  She states that she was using dry ice to pack these.  She states that there was about a total of 16 containers.  She states that she has never been around dry ice before, was not wearing a mask or the protective equipment.  She states that they were doing this out in a open space in her office.  She states that afterwards she took 6 of the containers and went to take the dry ice to [employer].  She states that she was in the vehicle with the dry eyes for about 45 minutes.  While part of the [employer] store she began to have shortness of breath, tachycardia, felt dizzy and nauseous.  She received a call from another coworker that the other employee who was bringing the other 10 containers of dry eyes had passed out on the side of the road.  She states she believes it was the dry ice exposure.  She presents here today for further evaluation    History provided by:  Patient   used: No        Review of Systems   Respiratory: Positive for shortness of breath.    Cardiovascular: Positive for palpitations.   Gastrointestinal: Positive for nausea.   Neurological: Positive for dizziness.   All other systems reviewed and are negative.      Past Medical History:   Diagnosis Date   • Anxiety        Allergies   Allergen Reactions   • Amoxicillin Anaphylaxis   • Keflex [Cephalexin] Anaphylaxis   • Penicillins Anaphylaxis   • Zithromax [Azithromycin] Anaphylaxis   • Iodine Hives     States she had \"a single hive\" after CT dye       History reviewed. No pertinent surgical history.    History reviewed. No pertinent family history.    Social History     Socioeconomic History   • Marital status:    Tobacco Use   • Smoking status: Never "     Passive exposure: Never   • Smokeless tobacco: Never   Vaping Use   • Vaping Use: Never used   Substance and Sexual Activity   • Alcohol use: Yes     Comment: rare   • Drug use: Never   • Sexual activity: Yes     Birth control/protection: Birth control pill           Objective   Physical Exam  Vitals and nursing note reviewed.   Constitutional:       Appearance: Normal appearance. She is well-developed.   HENT:      Head: Normocephalic and atraumatic.      Mouth/Throat:      Mouth: Mucous membranes are moist.      Pharynx: Oropharynx is clear.   Eyes:      Conjunctiva/sclera: Conjunctivae normal.      Pupils: Pupils are equal, round, and reactive to light.   Cardiovascular:      Rate and Rhythm: Regular rhythm. Tachycardia present.   Pulmonary:      Effort: Pulmonary effort is normal.      Breath sounds: Normal breath sounds.   Chest:          Comments: Erythema noted to chest wall; pt reports that this occurs when she is anxious  Abdominal:      General: Bowel sounds are normal.      Palpations: Abdomen is soft.   Skin:     General: Skin is warm and dry.      Capillary Refill: Capillary refill takes less than 2 seconds.   Neurological:      General: No focal deficit present.      Mental Status: She is alert and oriented to person, place, and time.   Psychiatric:         Mood and Affect: Mood normal.         Behavior: Behavior normal.         Procedures           ED Course  ED Course as of 12/14/22 1433   Tue Dec 13, 2022   1640 Poison control recommendations reviewed.  [LF]   1724 XR Chest 1 View [LF]   1905 Pt updated on labs; reports that she continues to not feel well and states that she feels short of breath while ambulating. HR 120s. I have asked nursing staff to do ambulatory O2 sat and to start IV and give IV fluids.  [LF]      ED Course User Index  [LF] Lorenza Hodge, KARLY                                 XR Chest 1 View   Final Result   1. No acute disease.               This report was finalized  on 12/13/2022 17:03 by Dr. Allan Dinero MD.        Labs Reviewed   COMPREHENSIVE METABOLIC PANEL - Abnormal; Notable for the following components:       Result Value    Glucose 118 (*)     Creatinine 0.53 (*)     Sodium 135 (*)     Alkaline Phosphatase 7 (*)     All other components within normal limits    Narrative:     GFR Normal >60  Chronic Kidney Disease <60  Kidney Failure <15     CBC WITH AUTO DIFFERENTIAL - Abnormal; Notable for the following components:    WBC 11.34 (*)     RDW 11.7 (*)     Neutrophil % 83.5 (*)     Lymphocyte % 12.1 (*)     Monocyte % 3.2 (*)     Neutrophils, Absolute 9.48 (*)     Immature Grans, Absolute 0.06 (*)     All other components within normal limits   BLOOD GAS, ARTERIAL W/CO-OXIMETRY - Abnormal; Notable for the following components:    pH, Arterial 7.496 (*)     pCO2, Arterial 29.7 (*)     pH, Temp Corrected 7.496 (*)     pCO2, Temperature Corrected 29.7 (*)     All other components within normal limits   HCG, SERUM, QUALITATIVE - Normal   URINALYSIS W/ CULTURE IF INDICATED - Normal    Narrative:     In absence of clinical symptoms, the presence of pyuria, bacteria, and/or nitrites on the urinalysis result does not correlate with infection.  Urine microscopic not indicated.   TROPONIN (IN-HOUSE) - Normal    Narrative:     Troponin T Reference Range:  <= 0.03 ng/mL-   Negative for AMI  >0.03 ng/mL-     Abnormal for myocardial necrosis.  Clinicians would have to utilize clinical acumen, EKG, Troponin and serial changes to determine if it is an Acute Myocardial Infarction or myocardial injury due to an underlying chronic condition.       Results may be falsely decreased if patient taking Biotin.     D-DIMER, QUANTITATIVE - Normal    Narrative:     According to the assay 's published package insert, a normal (<0.50 MCGFEU/mL) D-dimer result in conjunction with a non-high clinical probability assessment, excludes deep vein thrombosis (DVT) and pulmonary embolism (PE)  "with high sensitivity.    D-dimer values increase with age and this can make VTE exclusion of an older population difficult. To address this, the [employer], based on best available evidence and recent guidelines, recommends that clinicians use age-adjusted D-dimer thresholds in patients greater than 50 years of age with: a) a low probability of PE who do not meet all Pulmonary Embolism Rule Out Criteria, or b) in those with intermediate probability of PE.   The formula for an age-adjusted D-dimer cut-off is \"age/100\".  For example, a 60 year old patient would have an age-adjusted cut-off of 0.60 MCGFEU/mL and an 80 year old 0.80 MCGFEU/mL.   LACTIC ACID, PLASMA - Normal   URINE DRUG SCREEN - Normal    Narrative:     Cutoff For Drugs Screened:    Amphetamines               500 ng/ml  Barbiturates               200 ng/ml  Benzodiazepines            150 ng/ml  Cocaine                    150 ng/ml  Methadone                  200 ng/ml  Opiates                    100 ng/ml  Phencyclidine               25 ng/ml  THC                            50 ng/ml  Methamphetamine            500 ng/ml  Tricyclic Antidepressants  300 ng/ml  Oxycodone                  100 ng/ml  Propoxyphene               300 ng/ml  Buprenorphine               10 ng/ml    The normal value for all drugs tested is negative. This report includes unconfirmed screening results, with the cutoff values listed, to be used for medical treatment purposes only.  Unconfirmed results must not be used for non-medical purposes such as employment or legal testing.  Clinical consideration should be applied to any drug of abuse test, particularly when unconfirmed results are used.     MAGNESIUM - Normal   TSH - Normal   T4, FREE - Normal    Narrative:     Results may be falsely increased if patient taking Biotin.     ETHANOL    Narrative:     Not for legal purposes. Chain of Custody not followed.    BLOOD GAS, ARTERIAL W/CO-OXIMETRY   CBC AND " DIFFERENTIAL    Narrative:     The following orders were created for panel order CBC & Differential.  Procedure                               Abnormality         Status                     ---------                               -----------         ------                     CBC Auto Differential[[id number]]        Abnormal            Final result                 Please view results for these tests on the individual orders.            MDM  Number of Diagnoses or Management Options  Chemical exposure: new and requires workup  Tachycardia: new and requires workup     Amount and/or Complexity of Data Reviewed  Clinical lab tests: ordered and reviewed  Tests in the radiology section of CPT®: ordered and reviewed  Tests in the medicine section of CPT®: ordered and reviewed  Discuss the patient with other providers: yes    Patient Progress  Patient progress: stable      Final diagnoses:   Chemical exposure   Tachycardia       ED Disposition  ED Disposition     ED Disposition   Discharge    Condition   Stable    Comment   --             Julia Robison, DO  [address] [id number] [phone]    Call in 1 day           Medication List      No changes were made to your prescriptions during this visit.          Lorenza Hodge, APRN  12/13/22 8084       Lorenza Hodge, APRN  12/14/22 1434

## 2022-12-13 NOTE — ED NOTES
Unable to obtain IV access.  Lorenza provider notified. Ok to just obtain blood via straight stick

## 2022-12-13 NOTE — ED NOTES
In room to talk to patient. She is able to have full conversation with this RN with out difficulty speaking.  RR are even and unlabored.  Lungs remain clear. Patient up to the bathroom to obtain urine specimen

## 2022-12-14 NOTE — ED NOTES
Patient up to the bathroom at this time with steady gait.  RR even & unlabored with ambulation.  No s/s of distress

## 2022-12-14 NOTE — ED NOTES
Patient ambulated around the ER.  Highest heart rate with ambulation was 104.  89 at rest. Patient states that she feels better.

## 2022-12-14 NOTE — ED NOTES
Patient ambulated around the ER.  Highest pulse seen 144. Was 109 at rest prior to walking.  O2 sat remained 100% during ambulation

## 2022-12-17 LAB
QT INTERVAL: 306 MS
QTC INTERVAL: 446 MS

## 2023-01-12 ENCOUNTER — TRANSCRIBE ORDERS (OUTPATIENT)
Dept: ADMINISTRATIVE | Facility: HOSPITAL | Age: 33
End: 2023-01-12
Payer: COMMERCIAL

## 2023-01-12 DIAGNOSIS — R00.0 TACHYCARDIA, UNSPECIFIED: Primary | ICD-10-CM

## 2023-01-12 DIAGNOSIS — I10 ESSENTIAL (PRIMARY) HYPERTENSION: ICD-10-CM

## 2023-01-12 DIAGNOSIS — R42 DIZZINESS AND GIDDINESS: ICD-10-CM

## 2023-01-13 ENCOUNTER — HOSPITAL ENCOUNTER (OUTPATIENT)
Dept: CARDIOLOGY | Facility: HOSPITAL | Age: 33
Discharge: HOME | End: 2023-01-13
Admitting: FAMILY MEDICINE
Payer: COMMERCIAL

## 2023-01-13 DIAGNOSIS — I10 ESSENTIAL (PRIMARY) HYPERTENSION: ICD-10-CM

## 2023-01-13 DIAGNOSIS — R42 DIZZINESS AND GIDDINESS: ICD-10-CM

## 2023-01-13 DIAGNOSIS — R00.0 TACHYCARDIA, UNSPECIFIED: ICD-10-CM

## 2023-01-13 PROCEDURE — 93246 EXT ECG>7D<15D RECORDING: CPT

## 2023-01-24 ENCOUNTER — HOSPITAL ENCOUNTER (OUTPATIENT)
Dept: CARDIOLOGY | Facility: HOSPITAL | Age: 33
Discharge: HOME | End: 2023-01-24
Admitting: FAMILY MEDICINE
Payer: COMMERCIAL

## 2023-01-24 VITALS
BODY MASS INDEX: 25.59 KG/M2 | HEART RATE: 130 BPM | HEIGHT: 64 IN | SYSTOLIC BLOOD PRESSURE: 137 MMHG | OXYGEN SATURATION: 100 % | DIASTOLIC BLOOD PRESSURE: 80 MMHG | WEIGHT: 149.91 LBS

## 2023-01-24 DIAGNOSIS — R00.0 TACHYCARDIA, UNSPECIFIED: ICD-10-CM

## 2023-01-24 DIAGNOSIS — R42 DIZZINESS AND GIDDINESS: ICD-10-CM

## 2023-01-24 DIAGNOSIS — I10 ESSENTIAL (PRIMARY) HYPERTENSION: ICD-10-CM

## 2023-01-24 PROCEDURE — 93660 TILT TABLE EVALUATION: CPT | Performed by: EMERGENCY MEDICINE

## 2023-01-24 PROCEDURE — 93660 TILT TABLE EVALUATION: CPT

## 2023-01-24 RX ORDER — NITROGLYCERIN 0.4 MG/1
0.4 TABLET SUBLINGUAL ONCE
Status: COMPLETED | OUTPATIENT
Start: 2023-01-24 | End: 2023-01-24

## 2023-01-24 RX ADMIN — NITROGLYCERIN 0.4 MG: 0.4 TABLET SUBLINGUAL at 14:37

## 2023-01-25 LAB
BH CV TILT AGENT USED: NORMAL
MAXIMAL PREDICTED HEART RATE: 186 BPM
STRESS TARGET HR: 158 BPM

## 2023-01-26 LAB
MAXIMAL PREDICTED HEART RATE: 186 BPM
STRESS TARGET HR: 158 BPM

## 2023-01-26 PROCEDURE — 93248 EXT ECG>7D<15D REV&INTERPJ: CPT | Performed by: INTERNAL MEDICINE

## 2023-02-03 ENCOUNTER — HOSPITAL ENCOUNTER (OUTPATIENT)
Dept: CARDIOLOGY | Facility: HOSPITAL | Age: 33
Discharge: HOME | End: 2023-02-03
Admitting: FAMILY MEDICINE
Payer: COMMERCIAL

## 2023-02-03 VITALS
WEIGHT: 149 LBS | HEIGHT: 64 IN | SYSTOLIC BLOOD PRESSURE: 137 MMHG | BODY MASS INDEX: 25.44 KG/M2 | DIASTOLIC BLOOD PRESSURE: 80 MMHG

## 2023-02-03 DIAGNOSIS — I10 ESSENTIAL (PRIMARY) HYPERTENSION: ICD-10-CM

## 2023-02-03 DIAGNOSIS — R00.0 TACHYCARDIA, UNSPECIFIED: ICD-10-CM

## 2023-02-03 DIAGNOSIS — R42 DIZZINESS AND GIDDINESS: ICD-10-CM

## 2023-02-03 PROCEDURE — 93306 TTE W/DOPPLER COMPLETE: CPT | Performed by: INTERNAL MEDICINE

## 2023-02-03 PROCEDURE — 93306 TTE W/DOPPLER COMPLETE: CPT

## 2023-02-06 LAB
BH CV ECHO MEAS - AO MAX PG: 6.7 MMHG
BH CV ECHO MEAS - AO MEAN PG: 3 MMHG
BH CV ECHO MEAS - AO ROOT DIAM: 2.8 CM
BH CV ECHO MEAS - AO V2 MAX: 129 CM/SEC
BH CV ECHO MEAS - AO V2 VTI: 23.3 CM
BH CV ECHO MEAS - AVA(I,D): 3.2 CM2
BH CV ECHO MEAS - EDV(CUBED): 85.2 ML
BH CV ECHO MEAS - EDV(MOD-SP2): 87.5 ML
BH CV ECHO MEAS - EDV(MOD-SP4): 89.4 ML
BH CV ECHO MEAS - EF(MOD-BP): 59.7 %
BH CV ECHO MEAS - EF(MOD-SP2): 61.9 %
BH CV ECHO MEAS - EF(MOD-SP4): 56.9 %
BH CV ECHO MEAS - ESV(CUBED): 27 ML
BH CV ECHO MEAS - ESV(MOD-SP2): 33.3 ML
BH CV ECHO MEAS - ESV(MOD-SP4): 38.5 ML
BH CV ECHO MEAS - FS: 31.8 %
BH CV ECHO MEAS - IVS/LVPW: 0.89 CM
BH CV ECHO MEAS - IVSD: 0.8 CM
BH CV ECHO MEAS - LA DIMENSION: 3.5 CM
BH CV ECHO MEAS - LAT PEAK E' VEL: 17.7 CM/SEC
BH CV ECHO MEAS - LV DIASTOLIC VOL/BSA (35-75): 51.8 CM2
BH CV ECHO MEAS - LV MASS(C)D: 118.6 GRAMS
BH CV ECHO MEAS - LV MAX PG: 7.4 MMHG
BH CV ECHO MEAS - LV MEAN PG: 4 MMHG
BH CV ECHO MEAS - LV SYSTOLIC VOL/BSA (12-30): 22.3 CM2
BH CV ECHO MEAS - LV V1 MAX: 136 CM/SEC
BH CV ECHO MEAS - LV V1 VTI: 23.5 CM
BH CV ECHO MEAS - LVIDD: 4.4 CM
BH CV ECHO MEAS - LVIDS: 3 CM
BH CV ECHO MEAS - LVOT AREA: 3.1 CM2
BH CV ECHO MEAS - LVOT DIAM: 2 CM
BH CV ECHO MEAS - LVPWD: 0.9 CM
BH CV ECHO MEAS - MED PEAK E' VEL: 14.7 CM/SEC
BH CV ECHO MEAS - MV A MAX VEL: 73.7 CM/SEC
BH CV ECHO MEAS - MV DEC TIME: 0.19 MSEC
BH CV ECHO MEAS - MV E MAX VEL: 87 CM/SEC
BH CV ECHO MEAS - MV E/A: 1.18
BH CV ECHO MEAS - MV MAX PG: 3 MMHG
BH CV ECHO MEAS - MV MEAN PG: 1 MMHG
BH CV ECHO MEAS - MV V2 VTI: 21.6 CM
BH CV ECHO MEAS - MVA(VTI): 3.4 CM2
BH CV ECHO MEAS - PA V2 MAX: 87.6 CM/SEC
BH CV ECHO MEAS - RAP SYSTOLE: 5 MMHG
BH CV ECHO MEAS - RV MAX PG: 2.8 MMHG
BH CV ECHO MEAS - RV V1 MAX: 84.2 CM/SEC
BH CV ECHO MEAS - RV V1 VTI: 18.9 CM
BH CV ECHO MEAS - RVDD: 2.9 CM
BH CV ECHO MEAS - RVSP: 17.4 MMHG
BH CV ECHO MEAS - SI(MOD-SP2): 31.4 ML/M2
BH CV ECHO MEAS - SI(MOD-SP4): 29.5 ML/M2
BH CV ECHO MEAS - SV(LVOT): 73.8 ML
BH CV ECHO MEAS - SV(MOD-SP2): 54.2 ML
BH CV ECHO MEAS - SV(MOD-SP4): 50.9 ML
BH CV ECHO MEAS - TAPSE (>1.6): 2.7 CM
BH CV ECHO MEAS - TR MAX PG: 12.4 MMHG
BH CV ECHO MEAS - TR MAX VEL: 176 CM/SEC
BH CV ECHO MEASUREMENTS AVERAGE E/E' RATIO: 5.37
BH CV XLRA - TDI S': 11.1 CM/SEC
LEFT ATRIUM VOLUME: 28 ML
MAXIMAL PREDICTED HEART RATE: 186 BPM
STRESS TARGET HR: 158 BPM

## 2023-02-16 ENCOUNTER — OFFICE VISIT (OUTPATIENT)
Dept: CARDIOLOGY | Facility: CLINIC | Age: 33
End: 2023-02-16
Payer: COMMERCIAL

## 2023-02-16 ENCOUNTER — LAB (OUTPATIENT)
Dept: LAB | Facility: HOSPITAL | Age: 33
End: 2023-02-16
Payer: COMMERCIAL

## 2023-02-16 VITALS
HEIGHT: 64 IN | OXYGEN SATURATION: 99 % | HEART RATE: 95 BPM | SYSTOLIC BLOOD PRESSURE: 142 MMHG | BODY MASS INDEX: 28.17 KG/M2 | WEIGHT: 165 LBS | DIASTOLIC BLOOD PRESSURE: 84 MMHG

## 2023-02-16 DIAGNOSIS — R00.0 INAPPROPRIATE SINUS TACHYCARDIA: ICD-10-CM

## 2023-02-16 DIAGNOSIS — R55 VASOVAGAL EPISODE: ICD-10-CM

## 2023-02-16 DIAGNOSIS — R00.0 INAPPROPRIATE SINUS TACHYCARDIA: Primary | ICD-10-CM

## 2023-02-16 DIAGNOSIS — I10 PRIMARY HYPERTENSION: ICD-10-CM

## 2023-02-16 PROCEDURE — 99214 OFFICE O/P EST MOD 30 MIN: CPT | Performed by: HOSPITALIST

## 2023-02-16 PROCEDURE — 93000 ELECTROCARDIOGRAM COMPLETE: CPT | Performed by: HOSPITALIST

## 2023-02-16 RX ORDER — ALBUTEROL SULFATE 90 UG/1
2 AEROSOL, METERED RESPIRATORY (INHALATION) EVERY 4 HOURS PRN
COMMUNITY

## 2023-02-16 RX ORDER — DILTIAZEM HYDROCHLORIDE 120 MG/1
120 CAPSULE, EXTENDED RELEASE ORAL DAILY
Qty: 30 CAPSULE | Refills: 11 | Status: SHIPPED | OUTPATIENT
Start: 2023-02-16 | End: 2023-03-16

## 2023-02-16 RX ORDER — CETIRIZINE HYDROCHLORIDE 10 MG/1
1 TABLET ORAL 2 TIMES DAILY
COMMUNITY

## 2023-02-16 RX ORDER — LISINOPRIL 10 MG/1
1 TABLET ORAL DAILY
COMMUNITY
Start: 2023-01-26 | End: 2023-02-16

## 2023-02-16 RX ORDER — LEVALBUTEROL TARTRATE 45 UG/1
1-2 AEROSOL, METERED ORAL EVERY 4 HOURS PRN
COMMUNITY

## 2023-02-16 NOTE — PROGRESS NOTES
Reason For Visit:  Tachycardia and palpitations    Subjective        Kaley Huff is a 32 y.o. female with a PMH of HTN and asthma who is referred for tachycardia and palpitations.    Patient was seen in the ED 12/13/2022 due to development of shortness of breath, tachycardia, dizziness and nausea that developed while being exposed to fumes from dry ice for an extended period.  She reports that her HR improved significantly with IVF.  She followed up 1/10/23 with her PCP and discussed intermittent issues with tachycardia for about a year since having a tonsillectomy.  She was referred for an echocardiogram, tilt table test, and Zio patch.  Following results, she was referred to cardiology.    Today, the patient reports that she has intermittently had tachycardia and palpitations for at least a year since her tonsillectomy.  She reports at least one episode where she had hypertension and tachycardia that significantly improved with IVF.  She otherwise has noted intermittent palpitations sometimes randomly at rest but also disproportionately with activity.  Her palpitations were especially notable for about a month after her ED visit although has significantly improved more recently.  Her tilt table test did demonstrate a likely vasovagal response with lightheadedness.  She states that she intermittently in the past has had similar symptoms of lightheadedness that occurred transiently primarily when she is sitting down but has not had any syncopal episodes.  She does mention that she previously was prescribed propanolol but had to stop due to wheezing and asthma exacerbation.  She has not had significant asthma issues recently and has not had much use of her PRN inhalers for months.  She drinks at least 6 bottles of water daily.  She denies chest pain, shortness of breath, orthopnea, or lower extremity edema.  She does note that her BP is slightly elevated today and that she typically has elevated BP when she  "sees doctors but that it is well controlled at home with systolics primarily in the 120s.    ROS: Pertinent positives and negatives are included above    Pertinent past medical, surgical, family, and social history were reviewed and updated in the EMR.      Current Outpatient Medications:   •  albuterol sulfate  (90 Base) MCG/ACT inhaler, Inhale 2 puffs Every 4 (Four) Hours As Needed for Wheezing., Disp: , Rfl:   •  cetirizine (zyrTEC) 10 MG tablet, Take 1 tablet by mouth Daily., Disp: , Rfl:   •  levalbuterol (XOPENEX HFA) 45 MCG/ACT inhaler, Inhale 1-2 puffs Every 4 (Four) Hours As Needed for Wheezing., Disp: , Rfl:   •  norethindrone (AYGESTIN) 5 MG tablet, Take 5 mg by mouth Daily., Disp: , Rfl:   • Lisinopril 10 mg daily      Objective   Vital Signs:  /84   Pulse 95   Ht 162.6 cm (64\")   Wt 74.8 kg (165 lb)   SpO2 99%   BMI 28.32 kg/m²   Estimated body mass index is 25.58 kg/m² as calculated from the following:    Height as of 2/3/23: 162.6 cm (64\").    Weight as of 2/3/23: 67.6 kg (149 lb).      Constitutional:       Appearance: Healthy appearance. Not in distress.   Neck:      Vascular: JVD normal.   Pulmonary:      Effort: Pulmonary effort is normal.      Breath sounds: Normal breath sounds.   Cardiovascular:      Tachycardia present. Regular rhythm.      Murmurs: There is a grade 1/6 systolic murmur at the URSB.      No gallop. No click. No rub.   Edema:     Peripheral edema absent.   Abdominal:      General: There is no distension.      Palpations: Abdomen is soft.      Tenderness: There is no abdominal tenderness.   Skin:     General: Skin is warm and dry.   Neurological:      Mental Status: Alert and oriented to person, place and time.        Result Review :  The following data was reviewed by: Gonzalo Hopkins MD on 02/16/2023:  CMP   CMP    CMP 12/13/22 12/13/22 1739 1952   Glucose 118 (A)    BUN 12    Creatinine 0.53 (A)    eGFR 124.6    Sodium 135 (A) 138   Potassium 4.0  "   Chloride 101    Calcium 9.5    Total Protein 7.7    Albumin 4.90    Globulin 2.8    Total Bilirubin <0.2    Alkaline Phosphatase 7 (A)    AST (SGOT) 19    ALT (SGPT) 16    Albumin/Globulin Ratio 1.8    BUN/Creatinine Ratio 22.6    Anion Gap 10.0    (A) Abnormal value       Comments are available for some flowsheets but are not being displayed.           CBC WDIFF   CBC w/diff    CBC w/Diff 12/13/22   WBC 11.34 (A)   RBC 4.20   Hemoglobin 12.9   Hematocrit 39.3   MCV 93.6   MCH 30.7   MCHC 32.8   RDW 11.7 (A)   Platelets 280   Neutrophil Rel % 83.5 (A)   Immature Granulocyte Rel % 0.5   Lymphocyte Rel % 12.1 (A)   Monocyte Rel % 3.2 (A)   Eosinophil Rel % 0.4   Basophil Rel % 0.3   (A) Abnormal value            TSH   TSH    TSH 12/13/22   TSH 1.730           Free T4   Lab Results - Last 18 Months   Lab Units 12/13/22  1739   FREE T4 ng/dL 1.26          ECG 12 Lead    Date/Time: 2/16/2023 2:11 PM  Performed by: Gonzalo Hopkins MD  Authorized by: Gonzalo Hopkins MD   Comparison: compared with previous ECG from 12/13/2022  Comparison to previous ECG: Ventricular rate decreased 33 bpm  Rhythm: sinus arrhythmia    Clinical impression: normal ECG             Adult Transthoracic Echo Complete W/ Cont if Necessary Per Protocol (02/03/2023 11:26)  •  Left ventricular systolic function is normal. Left ventricular ejection fraction appears to be 56 - 60%.  •  Normal right ventricular cavity size and systolic function noted.  •  No significant valvular abnormalities identified on this study.    Tilt table (01/24/2023 16:08)  •  Prior to testing, patient's resting heart rate was running 120 bpm  •  With initial tilt phase, blood pressure increased as did heart rate  •  With sublingual nitroglycerin provocation, blood pressure decreased appropriately, heart rate increased appropriately until minute 5 when it dropped from 157 bpm down to 136 bpm and patient became very symptomatic including dizziness, lightheadedness and  nauseated  •  Appropriate heart rate and blood pressure response to the recovery phase.  •  Findings are consistent with possible vasovagal response.  Would recommend further work-up and treatment for patient's significant tachycardia at baseline.    Holter Monitor - 72 Hour Up To 15 Days (01/13/2023 09:28)  •  Sinus rhythm throughout with rare PACs and PVCs.  •  There is no significant pauses, arrhythmias, or events.  •  All patient symptoms correlated with sinus rhythm.  •  A normal monitor study.  The monitoring period was 6 days and 23 hours.  Average HR was 90 bpm (range 56 to 160 bpm).    On my review of the patient's monitor, some of her triggers and diary entries were associated with sinus tachycardia in the 110-120 range with no physical activity or mild activities like taking a shower.       Assessment and Plan   Diagnoses and all orders for this visit:    1. Inappropriate sinus tachycardia (Primary)  -     dilTIAZem XR (DILACOR XR) 120 MG 24 hr capsule; Take 1 capsule by mouth Daily.  Dispense: 30 capsule; Refill: 11  -     Metanephrines, Frac. Free, Plasma; Future  -     ECG 12 Lead    2. Vasovagal episode    3. Primary hypertension  -     Metanephrines, Frac. Free, Plasma; Future      Patient has had resting ECG with elevated HR as well as elevated average HR on cardiac monitor with disproportionately elevated HR associated with symptoms at none or low levels of activity that is consistent with inappropriate sinus tachycardia.  For this, she would like to try an alternative to a beta-blocker given her prior poor response to propranolol (although she likely could tolerate metoprolol); we will discontinue her lisinopril and trial her on diltiazem 120 mg daily.  We discussed the importance of maintaining adequate hydration, which she is doing.  I am sending plasma metanephrines as she does describe some occasional episodic hypertension and tachycardia that warrants further evaluation.  Lastly, she did have  a vasovagal response triggered by nitroglycerin during her tilt table test and has had similar symptoms in the past suggesting that she may intermittently have vasovagal episodes although has not had vasovagal syncope (note that the specificity of tilt table test findings is reduced in the setting of nitroglycerin administration).  She was advised on counter maneuvers and management of vasovagal episodes.    Follow Up   Return in about 3 weeks (around 3/9/2023).  Patient was given instructions and counseling regarding her condition or for health maintenance advice. Please see specific information pulled into the AVS if appropriate.       EMR Dragon/Transcription disclaimer: Much of this encounter note is an electronic transcription/translation of spoken language to printed text. The electronic translation of spoken language may permit erroneous, or at times, nonsensical words or phrases to be inadvertently transcribed; although I have reviewed the note for such errors, some may still exist.

## 2023-02-17 ENCOUNTER — LAB (OUTPATIENT)
Dept: LAB | Facility: HOSPITAL | Age: 33
End: 2023-02-17
Payer: COMMERCIAL

## 2023-02-17 PROCEDURE — 83835 ASSAY OF METANEPHRINES: CPT

## 2023-02-17 PROCEDURE — 36415 COLL VENOUS BLD VENIPUNCTURE: CPT

## 2023-02-24 LAB
METANEPH FREE SERPL-MCNC: 15.6 PG/ML (ref 0–88)
NORMETANEPHRINE SERPL-MCNC: 47.2 PG/ML (ref 0–210.1)

## 2023-02-27 ENCOUNTER — TELEPHONE (OUTPATIENT)
Dept: CARDIOLOGY | Facility: CLINIC | Age: 33
End: 2023-02-27
Payer: COMMERCIAL

## 2023-02-27 NOTE — TELEPHONE ENCOUNTER
----- Message from Gonzalo Hopkins MD sent at 2/25/2023  7:32 AM CST -----  Please notify Ms. Huff of her normal test result. Thank you.

## 2023-02-27 NOTE — TELEPHONE ENCOUNTER
The patient is called and notified of her lab result and she voices understanding.  Nicki Redmond MA

## 2023-03-16 ENCOUNTER — OFFICE VISIT (OUTPATIENT)
Dept: CARDIOLOGY | Facility: CLINIC | Age: 33
End: 2023-03-16
Payer: COMMERCIAL

## 2023-03-16 VITALS
HEART RATE: 80 BPM | WEIGHT: 165 LBS | OXYGEN SATURATION: 98 % | HEIGHT: 64 IN | BODY MASS INDEX: 28.17 KG/M2 | DIASTOLIC BLOOD PRESSURE: 88 MMHG | SYSTOLIC BLOOD PRESSURE: 148 MMHG

## 2023-03-16 DIAGNOSIS — I10 PRIMARY HYPERTENSION: Chronic | ICD-10-CM

## 2023-03-16 DIAGNOSIS — R00.0 INAPPROPRIATE SINUS TACHYCARDIA: Primary | Chronic | ICD-10-CM

## 2023-03-16 DIAGNOSIS — M79.89 LEG SWELLING: ICD-10-CM

## 2023-03-16 PROCEDURE — 93000 ELECTROCARDIOGRAM COMPLETE: CPT | Performed by: HOSPITALIST

## 2023-03-16 PROCEDURE — 99214 OFFICE O/P EST MOD 30 MIN: CPT | Performed by: HOSPITALIST

## 2023-03-16 RX ORDER — LISINOPRIL 5 MG/1
5 TABLET ORAL DAILY
Qty: 30 TABLET | Refills: 11 | Status: SHIPPED | OUTPATIENT
Start: 2023-03-16

## 2023-03-16 RX ORDER — TRIAMCINOLONE ACETONIDE 55 UG/1
2 SPRAY, METERED NASAL DAILY
COMMUNITY

## 2023-03-16 RX ORDER — LORATADINE 10 MG/1
CAPSULE, LIQUID FILLED ORAL
COMMUNITY

## 2023-03-16 RX ORDER — METOPROLOL SUCCINATE 50 MG/1
50 TABLET, EXTENDED RELEASE ORAL DAILY
Qty: 30 TABLET | Refills: 11 | Status: SHIPPED | OUTPATIENT
Start: 2023-03-16

## 2023-03-16 NOTE — PROGRESS NOTES
Reason For Visit:  Inappropriate sinus tachycardia    Subjective        Kaley Huff is a 33 y.o. female with a PMH of inappropriate sinus tachycardia, HTN, and asthma who presents for follow-up of appropriate sinus tachycardia.    Patient was seen in the ED 12/13/2022 for shortness of breath, tachycardia, dizziness, and nausea in the setting of inhaling dries fumes; she improved with IVF.  She had ongoing intermittent tachycardia issues, so PCP ordered an echo, tilt table test, and Zio patch with subsequent cardiology referral.  She was initially seen by me 2/16 and was felt to have inappropriate sinus tachycardia based on ECG and cardiac monitor review.  She was started on Cardizem 120 mg daily along with stopping her prior lisinopril.    She returns for follow-up today and reports that she felt great for about 2 weeks.  She did not have any significant issues with tachycardia/palpitations.  About a week ago, she started to notice intermittent swelling in her legs primarily in the evenings as well as more elevated BP in the 140s to 150s systolic (BP had initially been well controlled).  She does mention that she feels poorly on days when she does not drink or liquid IV (cocktail that includes sodium as well as other electrolytes that she will drink over the course of the day).  She has still been trying to maintain adequate hydration although did not do quite as well during the 2 weeks when she was feeling much better.  She still gets a little short of breath when she has elevated HR episodes, but this is much better controlled.  She otherwise denies chest pain, lightheadedness, or other cardiac concerns.    ROS: Pertinent positives and negatives are included above.    Pertinent past medical, surgical, family, and social history were reviewed.      Current Outpatient Medications:   •  albuterol sulfate  (90 Base) MCG/ACT inhaler, Inhale 2 puffs Every 4 (Four) Hours As Needed for Wheezing., Disp: ,  "Rfl:   •  cetirizine (zyrTEC) 10 MG tablet, Take 1 tablet by mouth 2 (Two) Times a Day., Disp: , Rfl:   •  dilTIAZem XR (DILACOR XR) 120 MG 24 hr capsule, Take 1 capsule by mouth Daily., Disp: 30 capsule, Rfl: 11  •  levalbuterol (XOPENEX HFA) 45 MCG/ACT inhaler, Inhale 1-2 puffs Every 4 (Four) Hours As Needed for Wheezing., Disp: , Rfl:   •  Loratadine (Claritin) 10 MG capsule, Take  by mouth., Disp: , Rfl:   •  norethindrone (AYGESTIN) 5 MG tablet, Take 1 tablet by mouth Daily., Disp: , Rfl:   •  Triamcinolone Acetonide (NASACORT) 55 MCG/ACT nasal inhaler, 2 sprays into the nostril(s) as directed by provider Daily., Disp: , Rfl:      Objective   Vital Signs:  /88   Pulse 80   Ht 162.6 cm (64\")   Wt 74.8 kg (165 lb)   SpO2 98%   BMI 28.32 kg/m²   Estimated body mass index is 28.32 kg/m² as calculated from the following:    Height as of this encounter: 162.6 cm (64\").    Weight as of this encounter: 74.8 kg (165 lb).      Constitutional:       Appearance: Healthy appearance. Not in distress.   Neck:      Vascular: JVD normal.   Pulmonary:      Effort: Pulmonary effort is normal.      Breath sounds: Normal breath sounds.   Cardiovascular:      Normal rate. Regular rhythm.      Murmurs: There is no murmur.      No gallop. No click. No rub.   Edema:     Peripheral edema absent.   Abdominal:      General: There is no distension.      Palpations: Abdomen is soft.      Tenderness: There is no abdominal tenderness.   Skin:     General: Skin is warm and dry.   Neurological:      Mental Status: Alert and oriented to person, place and time.        Result Review :           ECG 12 Lead    Date/Time: 3/16/2023 2:56 PM  Performed by: Gonzalo Hopkins MD  Authorized by: Gonzalo Hopkins MD   Comparison: compared with previous ECG from 2/16/2023  Comparison to previous ECG: Ventricular rate decreased by 15 bpm  Rhythm: sinus rhythm    Clinical impression: normal ECG             Previously reviewed:    Adult Transthoracic " Echo Complete W/ Cont if Necessary Per Protocol (02/03/2023 11:26)  •  Left ventricular systolic function is normal. Left ventricular ejection fraction appears to be 56 - 60%.  •  Normal right ventricular cavity size and systolic function noted.  •  No significant valvular abnormalities identified on this study.     Tilt table (01/24/2023 16:08)  •  Prior to testing, patient's resting heart rate was running 120 bpm  •  With initial tilt phase, blood pressure increased as did heart rate  •  With sublingual nitroglycerin provocation, blood pressure decreased appropriately, heart rate increased appropriately until minute 5 when it dropped from 157 bpm down to 136 bpm and patient became very symptomatic including dizziness, lightheadedness and nauseated  •  Appropriate heart rate and blood pressure response to the recovery phase.  •  Findings are consistent with possible vasovagal response.  Would recommend further work-up and treatment for patient's significant tachycardia at baseline.    Holter Monitor - 72 Hour Up To 15 Days (01/13/2023 09:28)  •  Sinus rhythm throughout with rare PACs and PVCs.  •  There is no significant pauses, arrhythmias, or events.  •  All patient symptoms correlated with sinus rhythm.  •  A normal monitor study.  The monitoring period was 6 days and 23 hours.  Average HR was 90 bpm (range 56 to 160 bpm).     On my review of the patient's monitor, some of her triggers and diary entries were associated with sinus tachycardia in the 110-120 range with no physical activity or mild activities like taking a shower.          Assessment and Plan   Diagnoses and all orders for this visit:    1. Inappropriate sinus tachycardia (Primary)    2. Primary hypertension    3. Leg swelling    Other orders  -     lisinopril (PRINIVIL,ZESTRIL) 5 MG tablet; Take 1 tablet by mouth Daily.  Dispense: 30 tablet; Refill: 11  -     metoprolol succinate XL (TOPROL-XL) 50 MG 24 hr tablet; Take 1 tablet by mouth Daily.   Dispense: 30 tablet; Refill: 11  -     ECG 12 Lead      Symptomatically doing much better regarding inappropriate sinus tachycardia with diltiazem, but she has developed some bothersome leg swelling.  She also has more elevated BP recently.  Echo previously was relatively unremarkable.  - Stop diltiazem 120 mg daily  - Start Toprol-XL 50 mg daily; consider increase at follow-up pending tolerance if still having symptoms  - Start lisinopril 5 mg daily  - Encouraged continued adequate hydration    Follow Up   Return in about 3 weeks (around 4/6/2023).  Patient was given instructions and counseling regarding her condition or for health maintenance advice. Please see specific information pulled into the AVS if appropriate.       EMR Dragon/Transcription disclaimer: Much of this encounter note is an electronic transcription/translation of spoken language to printed text. The electronic translation of spoken language may permit erroneous, or at times, nonsensical words or phrases to be inadvertently transcribed; although I have reviewed the note for such errors, some may still exist.

## 2023-04-13 ENCOUNTER — OFFICE VISIT (OUTPATIENT)
Dept: CARDIOLOGY | Facility: CLINIC | Age: 33
End: 2023-04-13
Payer: COMMERCIAL

## 2023-04-13 VITALS
OXYGEN SATURATION: 98 % | HEIGHT: 64 IN | BODY MASS INDEX: 29.37 KG/M2 | HEART RATE: 67 BPM | DIASTOLIC BLOOD PRESSURE: 74 MMHG | SYSTOLIC BLOOD PRESSURE: 132 MMHG | WEIGHT: 172 LBS

## 2023-04-13 DIAGNOSIS — R00.0 INAPPROPRIATE SINUS TACHYCARDIA: Primary | Chronic | ICD-10-CM

## 2023-04-13 DIAGNOSIS — I10 PRIMARY HYPERTENSION: Chronic | ICD-10-CM

## 2023-04-13 PROCEDURE — 93000 ELECTROCARDIOGRAM COMPLETE: CPT | Performed by: HOSPITALIST

## 2023-04-13 PROCEDURE — 99214 OFFICE O/P EST MOD 30 MIN: CPT | Performed by: HOSPITALIST

## 2023-04-13 NOTE — PROGRESS NOTES
Reason For Visit:  Follow-up inappropriate sinus tachycardia and hypertension    Subjective        Kaley Huff is a 33 y.o. female with a PMH of inappropriate sinus tachycardia, HTN, and asthma who presents for follow-up of IST and HTN.    Prior cardiac studies included below.  Previously had significant symptomatic benefit from diltiazem, but developed lower extremity swelling and was transitioned to metoprolol at her last visit in mid March.  At that time, she was also restarted on lisinopril 5 mg daily due to elevated BP.    She returns for follow-up today and reports that she has continued to feel well with quite good symptomatic control of her palpitations with metoprolol.  She has been checking her BP at home and notes that it has typically been 113-117/70's consistently with 2 different BP cuffs.  She did notice that after lunch she was frequently experiencing a headache and had SBP in the high 90's - low 100's, so she stopped taking lisinopril a bit ago; she reports that BP has remained stable.  She does state that she has a tendency to have higher BP when she comes in to clinic visits.  She is otherwise feeling well without any chest pain, shortness of breath, palpitations, or lightheadedness.  Lower extremity swelling has resolved since stopping diltiazem.    ROS: Pertinent findings are included above.    Pertinent past medical, surgical, family, and social history were reviewed.      Current Outpatient Medications:   •  albuterol sulfate  (90 Base) MCG/ACT inhaler, Inhale 2 puffs Every 4 (Four) Hours As Needed for Wheezing., Disp: , Rfl:   •  cetirizine (zyrTEC) 10 MG tablet, Take 1 tablet by mouth 2 (Two) Times a Day., Disp: , Rfl:   •  levalbuterol (XOPENEX HFA) 45 MCG/ACT inhaler, Inhale 1-2 puffs Every 4 (Four) Hours As Needed for Wheezing., Disp: , Rfl:   •  Loratadine 10 MG capsule, Take  by mouth., Disp: , Rfl:   •  metoprolol succinate XL (TOPROL-XL) 50 MG 24 hr tablet, Take 1 tablet  "by mouth Daily., Disp: 30 tablet, Rfl: 11  •  norethindrone (AYGESTIN) 5 MG tablet, Take 1 tablet by mouth Daily., Disp: , Rfl:   •  Triamcinolone Acetonide (NASACORT) 55 MCG/ACT nasal inhaler, 2 sprays into the nostril(s) as directed by provider Daily., Disp: , Rfl:   •  lisinopril (PRINIVIL,ZESTRIL) 5 MG tablet, Take 1 tablet by mouth Daily. (Patient not taking: Reported on 4/13/2023), Disp: 30 tablet, Rfl: 11     Objective   Vital Signs:  /74   Pulse 67   Ht 162.6 cm (64\")   Wt 78 kg (172 lb)   SpO2 98%   BMI 29.52 kg/m²   Estimated body mass index is 29.52 kg/m² as calculated from the following:    Height as of this encounter: 162.6 cm (64\").    Weight as of this encounter: 78 kg (172 lb).      Constitutional:       Appearance: Healthy appearance. Not in distress.   Neck:      Vascular: JVD normal.   Pulmonary:      Effort: Pulmonary effort is normal.      Breath sounds: Normal breath sounds.   Cardiovascular:      Normal rate. Regular rhythm.      Murmurs: There is no murmur.      No gallop. No click. No rub.   Edema:     Peripheral edema absent.   Skin:     General: Skin is warm and dry.   Neurological:      Mental Status: Alert and oriented to person, place and time.        Result Review :           ECG 12 Lead    Date/Time: 4/13/2023 3:27 PM  Performed by: Gonzalo Hopkins MD  Authorized by: Gonzalo Hopkins MD   Comparison: compared with previous ECG from 3/16/2023  Similar to previous ECG  Rhythm: sinus rhythm    Clinical impression: normal ECG             Previously reviewed:     Adult Transthoracic Echo Complete W/ Cont if Necessary Per Protocol (02/03/2023 11:26)  •  Left ventricular systolic function is normal. Left ventricular ejection fraction appears to be 56 - 60%.  •  Normal right ventricular cavity size and systolic function noted.  •  No significant valvular abnormalities identified on this study.     Tilt table (01/24/2023 16:08)  •  Prior to testing, patient's resting heart rate was " running 120 bpm  •  With initial tilt phase, blood pressure increased as did heart rate  •  With sublingual nitroglycerin provocation, blood pressure decreased appropriately, heart rate increased appropriately until minute 5 when it dropped from 157 bpm down to 136 bpm and patient became very symptomatic including dizziness, lightheadedness and nauseated  •  Appropriate heart rate and blood pressure response to the recovery phase.  •  Findings are consistent with possible vasovagal response.  Would recommend further work-up and treatment for patient's significant tachycardia at baseline.     Holter Monitor - 72 Hour Up To 15 Days (01/13/2023 09:28)  •  Sinus rhythm throughout with rare PACs and PVCs.  •  There is no significant pauses, arrhythmias, or events.  •  All patient symptoms correlated with sinus rhythm.  •  A normal monitor study.  The monitoring period was 6 days and 23 hours.  Average HR was 90 bpm (range 56 to 160 bpm).     On my review of the patient's monitor, some of her triggers and diary entries were associated with sinus tachycardia in the 110-120 range with no physical activity or mild activities like taking a shower.       Assessment and Plan   Diagnoses and all orders for this visit:    1. Inappropriate sinus tachycardia (Primary)    2. Primary hypertension    Other orders  -     ECG 12 Lead    Feeling well on metoprolol.  Lower extremity swelling resolved.  BP currently well controlled and not taking lisinopril, so I will discontinue it.  Given some lower BP in the early afternoon, we discussed transitioning Toprol-XL to p.m. dosing to see if she feels better.  She was advised to continue monitoring BP and to contact me should she have more elevated BP at which time we would plan to restart lisinopril at 2.5 mg daily.    Follow Up   Return in about 6 months (around 10/13/2023).  Patient was given instructions and counseling regarding her condition or for health maintenance advice. Please see  specific information pulled into the AVS if appropriate.       EMR Dragon/Transcription disclaimer: Much of this encounter note is an electronic transcription/translation of spoken language to printed text. The electronic translation of spoken language may permit erroneous, or at times, nonsensical words or phrases to be inadvertently transcribed; although I have reviewed the note for such errors, some may still exist.

## 2023-04-14 ENCOUNTER — TELEPHONE (OUTPATIENT)
Dept: CARDIOLOGY | Facility: CLINIC | Age: 33
End: 2023-04-14
Payer: COMMERCIAL

## 2023-04-14 RX ORDER — METOPROLOL SUCCINATE 50 MG/1
50 TABLET, EXTENDED RELEASE ORAL DAILY
Qty: 90 TABLET | Refills: 3 | Status: SHIPPED | OUTPATIENT
Start: 2023-04-14

## 2023-04-14 NOTE — TELEPHONE ENCOUNTER
The patient calls and leaves a message to call her back with issue regarding a RX.  Her call is returned with no answer.  Message is left for her to call me back.  Nicki Redmond MA

## 2023-04-19 ENCOUNTER — TELEPHONE (OUTPATIENT)
Dept: OBSTETRICS AND GYNECOLOGY | Facility: CLINIC | Age: 33
End: 2023-04-19

## 2023-04-19 NOTE — TELEPHONE ENCOUNTER
Caller: Kaley Huff    Relationship to patient: Self    Best call back number: [phone]    Patient is needing:     SAME DAY R/S    PT R/S FROM 4/19/23 @ 10:15 TO  5/10/23 @ 8:45    DUE TO SICK CHILD

## 2023-05-10 ENCOUNTER — OFFICE VISIT (OUTPATIENT)
Dept: OBSTETRICS AND GYNECOLOGY | Facility: CLINIC | Age: 33
End: 2023-05-10
Payer: COMMERCIAL

## 2023-05-10 VITALS
BODY MASS INDEX: 28.85 KG/M2 | DIASTOLIC BLOOD PRESSURE: 78 MMHG | WEIGHT: 169 LBS | HEIGHT: 64 IN | SYSTOLIC BLOOD PRESSURE: 124 MMHG

## 2023-05-10 DIAGNOSIS — Z30.09 STERILIZATION CONSULT: Primary | ICD-10-CM

## 2023-05-10 DIAGNOSIS — Z78.9 NON-SMOKER: ICD-10-CM

## 2023-05-10 RX ORDER — SODIUM CHLORIDE 0.9 % (FLUSH) 0.9 %
1-10 SYRINGE (ML) INJECTION AS NEEDED
OUTPATIENT
Start: 2023-05-10

## 2023-05-10 RX ORDER — ACETAMINOPHEN AND CODEINE PHOSPHATE 120; 12 MG/5ML; MG/5ML
SOLUTION ORAL
COMMUNITY
Start: 2023-04-13

## 2023-05-10 RX ORDER — LISINOPRIL 5 MG/1
TABLET ORAL
COMMUNITY
Start: 2023-05-08

## 2023-05-10 RX ORDER — SODIUM CHLORIDE 0.9 % (FLUSH) 0.9 %
10 SYRINGE (ML) INJECTION EVERY 12 HOURS SCHEDULED
OUTPATIENT
Start: 2023-05-10

## 2023-05-10 RX ORDER — SODIUM CHLORIDE 9 MG/ML
100 INJECTION, SOLUTION INTRAVENOUS CONTINUOUS
OUTPATIENT
Start: 2023-05-10

## 2023-05-10 RX ORDER — SODIUM CHLORIDE 9 MG/ML
40 INJECTION, SOLUTION INTRAVENOUS AS NEEDED
OUTPATIENT
Start: 2023-05-10

## 2023-05-10 NOTE — H&P (VIEW-ONLY)
Arturo Kennedy MD  [facility]  [address]  [place] [address]  Office [phone]  Fax [phone]      [facility]  Kaley Huff  1990  [id number]  [id number]  5/10/2023    Subjective   Kaley Huff is a 33 y.o. year old female  who presents for surgery due to Desire for Permanent Sterilization.  Failed conservative measures include N/A.    COEMIG Procedure no  Rating of Pain None  Effect on daily activities none    HPI    Risks, benefits, and alternatives of permanent sterilization were discussed with the patient in detail. Intraoperative risks of bleeding and damage to surrounding organs, including but not limited to intestine, bladder and ureter, were explained. Management of these were also explained. Postoperative complications such as infection, pneumonia, DVT, and bleeding were explained. The importance of compliance with postoperative restrictions was discussed. Laparoscopic versus hysteroscopic options were discussed. In regards to Essure, adequate contraception until hysterosalpingogram confirms tubal blockage was explained. Success and failure rates were discussed. Increased risk of ectopic pregnancy was explained. In addition, reversible forms of contraception were reviewed such as the pill, the patch, the shot, and the IUD.     Specifically, bilateral salpingectomy was discussed.  Reduction in fallopian tube cancer was discussed as well as potential decrease in ovarian cancer risk discussed.  Irreversible nature of this approach as well as the need for at least one additional laparoscopic incision was discussed.    Recent concerns regarding the Essure procedure were reviewed as well as the management of those issues, if they were to occur, were explained in detail.    All of the patient's questions were answered to her satisfaction. She was encouraged to return for an additional appointment if she had further questions. She verbalized  "understanding of the above and wished to proceed with the outlined plan.    Patient desires bilateral salpingectomy    Past Medical History:   Diagnosis Date   • Anxiety    • Asthma    • Hypertension    • Tachycardia        Past Surgical History:   Procedure Laterality Date   •  SECTION      X 2   • TONSILLECTOMY           Current Outpatient Medications:   •  cetirizine (zyrTEC) 10 MG tablet, Take 1 tablet by mouth 2 (Two) Times a Day., Disp: , Rfl:   •  levalbuterol (XOPENEX HFA) 45 MCG/ACT inhaler, Inhale 1-2 puffs Every 4 (Four) Hours As Needed for Wheezing., Disp: , Rfl:   •  lisinopril (PRINIVIL,ZESTRIL) 5 MG tablet, , Disp: , Rfl:   •  Loratadine 10 MG capsule, Take  by mouth., Disp: , Rfl:   •  metoprolol succinate XL (TOPROL-XL) 50 MG 24 hr tablet, Take 1 tablet by mouth Daily., Disp: 90 tablet, Rfl: 3  •  norethindrone (MICRONOR) 0.35 MG tablet, , Disp: , Rfl:   •  Triamcinolone Acetonide (NASACORT) 55 MCG/ACT nasal inhaler, 2 sprays into the nostril(s) as directed by provider Daily., Disp: , Rfl:     Allergies   Allergen Reactions   • Amoxicillin Anaphylaxis   • Keflex [Cephalexin] Anaphylaxis   • Penicillins Anaphylaxis   • Zithromax [Azithromycin] Anaphylaxis   • Iodine Hives     States she had \"a single hive\" after CT dye   • Erythromycin Rash       Family History   Problem Relation Age of Onset   • Anemia Mother    • Asthma Mother    • Hypertension Mother    • Valvular heart disease Mother    • Hypertension Father    • Heart disease Father        Social History     Socioeconomic History   • Marital status:    Tobacco Use   • Smoking status: Never     Passive exposure: Never   • Smokeless tobacco: Never   Vaping Use   • Vaping Use: Never used   Substance and Sexual Activity   • Alcohol use: Yes     Comment: 1-2 small glasses of wine a month   • Drug use: Never   • Sexual activity: Yes     Partners: Male     Birth control/protection: Birth control pill       OB History    Para " Term  AB Living   2 2 0 2 0 2   SAB IAB Ectopic Molar Multiple Live Births   0 0 0 0 0 2      # Outcome Date GA Lbr Josemanuel/2nd Weight Sex Delivery Anes PTL Lv   2  12 33w0d  1644 g (3 lb 10 oz) F CS-LTranv   OPAL   1  08 36w0d  2637 g (5 lb 13 oz) F CS-LTranv   OPAL       Review of Systems   All other systems reviewed and are negative.         Objective   Vitals:    05/10/23 0844   BP: 124/78       Physical Exam  Vitals and nursing note reviewed. Exam conducted with a chaperone present.   Constitutional:       Appearance: Normal appearance.   HENT:      Head: Normocephalic and atraumatic.   Abdominal:      General: Abdomen is flat. Bowel sounds are normal.      Palpations: Abdomen is soft.   Musculoskeletal:         General: Normal range of motion.      Cervical back: Normal range of motion and neck supple.   Skin:     General: Skin is warm and dry.   Neurological:      General: No focal deficit present.      Mental Status: She is alert and oriented to person, place, and time. Mental status is at baseline.   Psychiatric:         Mood and Affect: Mood normal.         Behavior: Behavior normal.         Thought Content: Thought content normal.         Judgment: Judgment normal.            Assessment & Plan   Assessment/Plan:  Diagnoses and all orders for this visit:    1. Sterilization consult (Primary)  -     Case Request  -     sodium chloride 0.9 % infusion  -     sodium chloride 0.9 % flush 10 mL  -     sodium chloride 0.9 % flush 1-10 mL  -     sodium chloride 0.9 % infusion 40 mL  -     levoFLOXacin (LEVAQUIN) 500 mg in sodium chloride 0.9 % 150 mL IVPB  -     clindamycin (CLEOCIN) 900 mg in dextrose (D5W) 5 % 100 mL IVPB    2. Non-smoker    Other orders  -     Follow Anesthesia Guidelines / Protocol; Future  -     Obtain Informed Consent; Future  -     Provide NPO Instructions to Patient; Future  -     Chlorhexidine Skin Prep; Future  -     Follow Anesthesia Guidelines / Protocol;  Standing  -     SCD (Sequential Compression Device) - Place on Patient in Pre-Op; Standing  -     Verify / Perform Chlorhexidine Skin Prep; Standing  -     Verify / Perform Chlorhexidine Skin Prep if Indicated (If Not Already Completed); Standing  -     Instructions on coughing, deep breathing, and incentive spirometry.; Standing  -     Insert Peripheral IV; Standing  -     Saline Lock & Maintain IV Access; Standing        The risks, benefits, and alternatives of the procedure; along with the risks of anesthesia was discussed in full with the patient and all questions were answered.    Plan laparoscopic bilateral salpingectomy for sterilization    Arturo Kennedy MD

## 2023-05-10 NOTE — H&P
Arturo Kennedy MD  [facility]  [address]  [place] [address]  Office [phone]  Fax [phone]      [facility]  Kaley Huff  1990  [id number]  [id number]  5/10/2023    Subjective   Kaley Huff is a 33 y.o. year old female  who presents for surgery due to Desire for Permanent Sterilization.  Failed conservative measures include N/A.    COEMIG Procedure no  Rating of Pain None  Effect on daily activities none    HPI    Risks, benefits, and alternatives of permanent sterilization were discussed with the patient in detail. Intraoperative risks of bleeding and damage to surrounding organs, including but not limited to intestine, bladder and ureter, were explained. Management of these were also explained. Postoperative complications such as infection, pneumonia, DVT, and bleeding were explained. The importance of compliance with postoperative restrictions was discussed. Laparoscopic versus hysteroscopic options were discussed. In regards to Essure, adequate contraception until hysterosalpingogram confirms tubal blockage was explained. Success and failure rates were discussed. Increased risk of ectopic pregnancy was explained. In addition, reversible forms of contraception were reviewed such as the pill, the patch, the shot, and the IUD.     Specifically, bilateral salpingectomy was discussed.  Reduction in fallopian tube cancer was discussed as well as potential decrease in ovarian cancer risk discussed.  Irreversible nature of this approach as well as the need for at least one additional laparoscopic incision was discussed.    Recent concerns regarding the Essure procedure were reviewed as well as the management of those issues, if they were to occur, were explained in detail.    All of the patient's questions were answered to her satisfaction. She was encouraged to return for an additional appointment if she had further questions. She verbalized  "understanding of the above and wished to proceed with the outlined plan.    Patient desires bilateral salpingectomy    Past Medical History:   Diagnosis Date   • Anxiety    • Asthma    • Hypertension    • Tachycardia        Past Surgical History:   Procedure Laterality Date   •  SECTION      X 2   • TONSILLECTOMY           Current Outpatient Medications:   •  cetirizine (zyrTEC) 10 MG tablet, Take 1 tablet by mouth 2 (Two) Times a Day., Disp: , Rfl:   •  levalbuterol (XOPENEX HFA) 45 MCG/ACT inhaler, Inhale 1-2 puffs Every 4 (Four) Hours As Needed for Wheezing., Disp: , Rfl:   •  lisinopril (PRINIVIL,ZESTRIL) 5 MG tablet, , Disp: , Rfl:   •  Loratadine 10 MG capsule, Take  by mouth., Disp: , Rfl:   •  metoprolol succinate XL (TOPROL-XL) 50 MG 24 hr tablet, Take 1 tablet by mouth Daily., Disp: 90 tablet, Rfl: 3  •  norethindrone (MICRONOR) 0.35 MG tablet, , Disp: , Rfl:   •  Triamcinolone Acetonide (NASACORT) 55 MCG/ACT nasal inhaler, 2 sprays into the nostril(s) as directed by provider Daily., Disp: , Rfl:     Allergies   Allergen Reactions   • Amoxicillin Anaphylaxis   • Keflex [Cephalexin] Anaphylaxis   • Penicillins Anaphylaxis   • Zithromax [Azithromycin] Anaphylaxis   • Iodine Hives     States she had \"a single hive\" after CT dye   • Erythromycin Rash       Family History   Problem Relation Age of Onset   • Anemia Mother    • Asthma Mother    • Hypertension Mother    • Valvular heart disease Mother    • Hypertension Father    • Heart disease Father        Social History     Socioeconomic History   • Marital status:    Tobacco Use   • Smoking status: Never     Passive exposure: Never   • Smokeless tobacco: Never   Vaping Use   • Vaping Use: Never used   Substance and Sexual Activity   • Alcohol use: Yes     Comment: 1-2 small glasses of wine a month   • Drug use: Never   • Sexual activity: Yes     Partners: Male     Birth control/protection: Birth control pill       OB History    Para " Term  AB Living   2 2 0 2 0 2   SAB IAB Ectopic Molar Multiple Live Births   0 0 0 0 0 2      # Outcome Date GA Lbr Josemanuel/2nd Weight Sex Delivery Anes PTL Lv   2  12 33w0d  1644 g (3 lb 10 oz) F CS-LTranv   OPAL   1  08 36w0d  2637 g (5 lb 13 oz) F CS-LTranv   OPAL       Review of Systems   All other systems reviewed and are negative.         Objective   Vitals:    05/10/23 0844   BP: 124/78       Physical Exam  Vitals and nursing note reviewed. Exam conducted with a chaperone present.   Constitutional:       Appearance: Normal appearance.   HENT:      Head: Normocephalic and atraumatic.   Abdominal:      General: Abdomen is flat. Bowel sounds are normal.      Palpations: Abdomen is soft.   Musculoskeletal:         General: Normal range of motion.      Cervical back: Normal range of motion and neck supple.   Skin:     General: Skin is warm and dry.   Neurological:      General: No focal deficit present.      Mental Status: She is alert and oriented to person, place, and time. Mental status is at baseline.   Psychiatric:         Mood and Affect: Mood normal.         Behavior: Behavior normal.         Thought Content: Thought content normal.         Judgment: Judgment normal.            Assessment & Plan   Assessment/Plan:  Diagnoses and all orders for this visit:    1. Sterilization consult (Primary)  -     Case Request  -     sodium chloride 0.9 % infusion  -     sodium chloride 0.9 % flush 10 mL  -     sodium chloride 0.9 % flush 1-10 mL  -     sodium chloride 0.9 % infusion 40 mL  -     levoFLOXacin (LEVAQUIN) 500 mg in sodium chloride 0.9 % 150 mL IVPB  -     clindamycin (CLEOCIN) 900 mg in dextrose (D5W) 5 % 100 mL IVPB    2. Non-smoker    Other orders  -     Follow Anesthesia Guidelines / Protocol; Future  -     Obtain Informed Consent; Future  -     Provide NPO Instructions to Patient; Future  -     Chlorhexidine Skin Prep; Future  -     Follow Anesthesia Guidelines / Protocol;  Standing  -     SCD (Sequential Compression Device) - Place on Patient in Pre-Op; Standing  -     Verify / Perform Chlorhexidine Skin Prep; Standing  -     Verify / Perform Chlorhexidine Skin Prep if Indicated (If Not Already Completed); Standing  -     Instructions on coughing, deep breathing, and incentive spirometry.; Standing  -     Insert Peripheral IV; Standing  -     Saline Lock & Maintain IV Access; Standing        The risks, benefits, and alternatives of the procedure; along with the risks of anesthesia was discussed in full with the patient and all questions were answered.    Plan laparoscopic bilateral salpingectomy for sterilization    Arturo Kennedy MD

## 2023-05-10 NOTE — LETTER
May 10, 2023       No Recipients    Patient: Kaley Huff   YOB: 1990   Date of Visit: 5/10/2023       Dear Dr. Interiano Recipients:    Thank you for referring Kaley Huff to me for evaluation. Below are the relevant portions of my assessment and plan of care.    If you have questions, please do not hesitate to call me. I look forward to following Kaley along with you.         Sincerely,        Arturo Kennedy MD        CC:   No Recipients    Arturo Kennedy MD  05/10/23 0921  Signed  Chief Complaint  Sterilization (Pt here to discuss sterilization, last pap 2022 with Julia Robison and was normal per pt report and denies any hx abnormal)    Subjective         Kaley Huff presents to [facility]  History of Present Illness     Patient presents today to discuss permanent sterilization  She is referred by primary care    Risks, benefits, and alternatives of permanent sterilization were discussed with the patient in detail. Intraoperative risks of bleeding and damage to surrounding organs, including but not limited to intestine, bladder and ureter, were explained. Management of these were also explained. Postoperative complications such as infection, pneumonia, DVT, and bleeding were explained. The importance of compliance with postoperative restrictions was discussed. Laparoscopic versus hysteroscopic options were discussed. In regards to Essure, adequate contraception until hysterosalpingogram confirms tubal blockage was explained. Success and failure rates were discussed. Increased risk of ectopic pregnancy was explained. In addition, reversible forms of contraception were reviewed such as the pill, the patch, the shot, and the IUD.     Specifically, bilateral salpingectomy was discussed.  Reduction in fallopian tube cancer was discussed as well as potential decrease in ovarian cancer risk discussed.  Irreversible nature of this approach as well as the need  "for at least one additional laparoscopic incision was discussed.    Recent concerns regarding the Essure procedure were reviewed as well as the management of those issues, if they were to occur, were explained in detail.    All of the patient's questions were answered to her satisfaction. She was encouraged to return for an additional appointment if she had further questions. She verbalized understanding of the above and wished to proceed with the outlined plan.    She desires bilateral salpingectomy    Objective    Vital Signs:  /78 (BP Location: Left arm, Patient Position: Sitting, Cuff Size: Adult)   Ht 162.6 cm (64\")   Wt 76.7 kg (169 lb)   BMI 29.01 kg/m²   Estimated body mass index is 29.01 kg/m² as calculated from the following:    Height as of this encounter: 162.6 cm (64\").    Weight as of this encounter: 76.7 kg (169 lb).             Physical Exam  Vitals and nursing note reviewed. Exam conducted with a chaperone present.   Constitutional:       Appearance: Normal appearance.   HENT:      Head: Normocephalic and atraumatic.   Musculoskeletal:         General: Normal range of motion.   Skin:     General: Skin is warm and dry.   Neurological:      General: No focal deficit present.      Mental Status: She is alert and oriented to person, place, and time. Mental status is at baseline.   Psychiatric:         Mood and Affect: Mood normal.         Behavior: Behavior normal.         Thought Content: Thought content normal.         Judgment: Judgment normal.        Result Review :                  Assessment and Plan   Diagnoses and all orders for this visit:    1. Sterilization consult (Primary)  -     Case Request  -     sodium chloride 0.9 % infusion  -     sodium chloride 0.9 % flush 10 mL  -     sodium chloride 0.9 % flush 1-10 mL  -     sodium chloride 0.9 % infusion 40 mL  -     levoFLOXacin (LEVAQUIN) 500 mg in sodium chloride 0.9 % 150 mL IVPB  -     clindamycin (CLEOCIN) 900 mg in dextrose (D5W) " 5 % 100 mL IVPB    2. Non-smoker    Other orders  -     Follow Anesthesia Guidelines / Protocol; Future  -     Obtain Informed Consent; Future  -     Provide NPO Instructions to Patient; Future  -     Chlorhexidine Skin Prep; Future  -     Follow Anesthesia Guidelines / Protocol; Standing  -     SCD (Sequential Compression Device) - Place on Patient in Pre-Op; Standing  -     Verify / Perform Chlorhexidine Skin Prep; Standing  -     Verify / Perform Chlorhexidine Skin Prep if Indicated (If Not Already Completed); Standing  -     Instructions on coughing, deep breathing, and incentive spirometry.; Standing  -     Insert Peripheral IV; Standing  -     Saline Lock & Maintain IV Access; Standing            Follow Up   No follow-ups on file.  Patient was given instructions and counseling regarding her condition or for health maintenance advice. Please see specific information pulled into the AVS if appropriate.     Plan laparoscopic bilateral salpingectomy for sterilization    Arturo Kennedy MD       room air

## 2023-05-10 NOTE — PROGRESS NOTES
Chief Complaint  Sterilization (Pt here to discuss sterilization, last pap 2022 with Julia Robison and was normal per pt report and denies any hx abnormal)    Subjective        Kaely LILIANA Huff presents to [facility]  History of Present Illness     Patient presents today to discuss permanent sterilization  She is referred by primary care    Risks, benefits, and alternatives of permanent sterilization were discussed with the patient in detail. Intraoperative risks of bleeding and damage to surrounding organs, including but not limited to intestine, bladder and ureter, were explained. Management of these were also explained. Postoperative complications such as infection, pneumonia, DVT, and bleeding were explained. The importance of compliance with postoperative restrictions was discussed. Laparoscopic versus hysteroscopic options were discussed. In regards to Essure, adequate contraception until hysterosalpingogram confirms tubal blockage was explained. Success and failure rates were discussed. Increased risk of ectopic pregnancy was explained. In addition, reversible forms of contraception were reviewed such as the pill, the patch, the shot, and the IUD.     Specifically, bilateral salpingectomy was discussed.  Reduction in fallopian tube cancer was discussed as well as potential decrease in ovarian cancer risk discussed.  Irreversible nature of this approach as well as the need for at least one additional laparoscopic incision was discussed.    Recent concerns regarding the Essure procedure were reviewed as well as the management of those issues, if they were to occur, were explained in detail.    All of the patient's questions were answered to her satisfaction. She was encouraged to return for an additional appointment if she had further questions. She verbalized understanding of the above and wished to proceed with the outlined plan.    She desires bilateral salpingectomy    Objective  "  Vital Signs:  /78 (BP Location: Left arm, Patient Position: Sitting, Cuff Size: Adult)   Ht 162.6 cm (64\")   Wt 76.7 kg (169 lb)   BMI 29.01 kg/m²   Estimated body mass index is 29.01 kg/m² as calculated from the following:    Height as of this encounter: 162.6 cm (64\").    Weight as of this encounter: 76.7 kg (169 lb).             Physical Exam  Vitals and nursing note reviewed. Exam conducted with a chaperone present.   Constitutional:       Appearance: Normal appearance.   HENT:      Head: Normocephalic and atraumatic.   Musculoskeletal:         General: Normal range of motion.   Skin:     General: Skin is warm and dry.   Neurological:      General: No focal deficit present.      Mental Status: She is alert and oriented to person, place, and time. Mental status is at baseline.   Psychiatric:         Mood and Affect: Mood normal.         Behavior: Behavior normal.         Thought Content: Thought content normal.         Judgment: Judgment normal.        Result Review :                   Assessment and Plan   Diagnoses and all orders for this visit:    1. Sterilization consult (Primary)  -     Case Request  -     sodium chloride 0.9 % infusion  -     sodium chloride 0.9 % flush 10 mL  -     sodium chloride 0.9 % flush 1-10 mL  -     sodium chloride 0.9 % infusion 40 mL  -     levoFLOXacin (LEVAQUIN) 500 mg in sodium chloride 0.9 % 150 mL IVPB  -     clindamycin (CLEOCIN) 900 mg in dextrose (D5W) 5 % 100 mL IVPB    2. Non-smoker    Other orders  -     Follow Anesthesia Guidelines / Protocol; Future  -     Obtain Informed Consent; Future  -     Provide NPO Instructions to Patient; Future  -     Chlorhexidine Skin Prep; Future  -     Follow Anesthesia Guidelines / Protocol; Standing  -     SCD (Sequential Compression Device) - Place on Patient in Pre-Op; Standing  -     Verify / Perform Chlorhexidine Skin Prep; Standing  -     Verify / Perform Chlorhexidine Skin Prep if Indicated (If Not Already Completed); " Standing  -     Instructions on coughing, deep breathing, and incentive spirometry.; Standing  -     Insert Peripheral IV; Standing  -     Saline Lock & Maintain IV Access; Standing             Follow Up   No follow-ups on file.  Patient was given instructions and counseling regarding her condition or for health maintenance advice. Please see specific information pulled into the AVS if appropriate.     Plan laparoscopic bilateral salpingectomy for sterilization    Arturo Kennedy MD

## 2023-05-16 ENCOUNTER — PRE-ADMISSION TESTING (OUTPATIENT)
Dept: PREADMISSION TESTING | Facility: HOSPITAL | Age: 33
End: 2023-05-16
Payer: COMMERCIAL

## 2023-05-16 ENCOUNTER — TELEPHONE (OUTPATIENT)
Dept: OBSTETRICS AND GYNECOLOGY | Facility: CLINIC | Age: 33
End: 2023-05-16
Payer: COMMERCIAL

## 2023-05-16 VITALS
HEIGHT: 64 IN | HEART RATE: 77 BPM | WEIGHT: 172.4 LBS | RESPIRATION RATE: 18 BRPM | OXYGEN SATURATION: 97 % | DIASTOLIC BLOOD PRESSURE: 79 MMHG | BODY MASS INDEX: 29.43 KG/M2 | SYSTOLIC BLOOD PRESSURE: 126 MMHG

## 2023-05-16 LAB
DEPRECATED RDW RBC AUTO: 42.1 FL (ref 37–54)
ERYTHROCYTE [DISTWIDTH] IN BLOOD BY AUTOMATED COUNT: 12.3 % (ref 12.3–15.4)
HCT VFR BLD AUTO: 40.6 % (ref 34–46.6)
HGB BLD-MCNC: 12.9 G/DL (ref 12–15.9)
MCH RBC QN AUTO: 29.5 PG (ref 26.6–33)
MCHC RBC AUTO-ENTMCNC: 31.8 G/DL (ref 31.5–35.7)
MCV RBC AUTO: 92.9 FL (ref 79–97)
PLATELET # BLD AUTO: 246 10*3/MM3 (ref 140–450)
PMV BLD AUTO: 11.8 FL (ref 6–12)
RBC # BLD AUTO: 4.37 10*6/MM3 (ref 3.77–5.28)
WBC NRBC COR # BLD: 6.5 10*3/MM3 (ref 3.4–10.8)

## 2023-05-16 PROCEDURE — 85027 COMPLETE CBC AUTOMATED: CPT

## 2023-05-16 PROCEDURE — 36415 COLL VENOUS BLD VENIPUNCTURE: CPT

## 2023-05-16 RX ORDER — CHOLECALCIFEROL (VITAMIN D3) 125 MCG
12 CAPSULE ORAL NIGHTLY
COMMUNITY

## 2023-05-16 NOTE — TELEPHONE ENCOUNTER
Out patient pre work nurse, Caitlin called and is requesting sterilization consent be faxed into patients chart.

## 2023-05-16 NOTE — DISCHARGE INSTRUCTIONS
Before you come to the hospital        Arrival time: AS DIRECTED BY OFFICE     YOU MAY TAKE THE FOLLOWING MEDICATION(S) THE MORNING OF SURGERY WITH A SIP OF WATER: Metoprolol           DO NOT TAKE LISINOPRIL FOR 24 HOURS PRIOR TO SURGERY            ALL OTHER HOME MEDICATION CHECK WITH YOUR PHYSICIAN (especially if you are taking diabetes medicines or blood thinners)      If you were given and instructed to use a germ- killing soap, use as directed the night before surgery and again the morning of surgery or as directed by your surgeon. (Use one-half of the bottle with each shower.)   See attached information for How to Use Chlorhexidine for Bathing if applicable.            Eating and drinking restrictions prior to scheduled arrival time    2 Hours before arrival time STOP   Drinking Clear liquids (water, black coffee-NO CREAM,  apple juice-no pulp)      8 Hours before arrival time STOP   All food, full liquids, and dairy products    (It is extremely important that you follow these guidelines to prevent delay or cancelation of your procedure)     Clear Liquids  Water and flavored water                                                                      Clear Fruit juices, such as cranberry juice and apple juice.  Black coffee (NO cream of any kind, including powdered).  Plain tea  Clear bouillon or broth.  Flavored gelatin.  Soda.  Gatorade or Powerade.  Full liquid examples  Juices that have pulp.  Frozen ice pops that contain fruit pieces.  Coffee with creamer  Milk.  Yogurt.                MANAGING PAIN AFTER SURGERY    We know you are probably wondering what your pain will be like after surgery.  Following surgery it is unrealistic to expect you will not have pain.   Pain is how our bodies let us know that something is wrong or cautions us to be careful.  That said, our goal is to make your pain tolerable.    Methods we may use to treat your pain include (oral or IV medications, PCAs, epidurals, nerve blocks,  etc.)   While some procedures require IV pain medications for a short time after surgery, transitioning to pain medications by mouth allows for better management of pain.   Your nurse will encourage you to take oral pain medications whenever possible.  IV medications work almost immediately, but only last a short while.  Taking medications by mouth allows for a more constant level of medication in your blood stream for a longer period of time.      Once your pain is out of control it is harder to get back under control.  It is important you are aware when your next dose of pain medication is due.  If you are admitted, your nurse may write the time of your next dose on the white board in your room to help you remember.      We are interested in your pain and encourage you to inform us about aggravating factors during your visit.   Many times a simple repositioning every few hours can make a big difference.    If your physician says it is okay, do not let your pain prevent you from getting out of bed. Be sure to call your nurse for assistance prior to getting up so you do not fall.      Before surgery, please decide your tolerable pain goal.  These faces help describe the pain ratings we use on a 0-10 scale.   Be prepared to tell us your goal and whether or not you take pain or anxiety medications at home.          Preparing for Surgery  Preparing for surgery is an important part of your care. It can make things go more smoothly and help you avoid complications. The steps leading up to surgery may vary among hospitals. Follow all instructions given to you by your health care providers. Ask questions if you do not understand something. Talk about any concerns that you have.  Here are some questions to consider asking before your surgery:  If my surgery is not an emergency (is elective), when would be the best time to have the surgery?  What arrangements do I need to make for work, home, or school?  What will my  recovery be like? How long will it be before I can return to normal activities?  Will I need to prepare my home? Will I need to arrange care for me or my children?  Should I expect to have pain after surgery? What are my pain management options? Are there nonmedical options that I can try for pain?  Tell a health care provider about:  Any allergies you have.  All medicines you are taking, including vitamins, herbs, eye drops, creams, and over-the-counter medicines.  Any problems you or family members have had with anesthetic medicines.  Any blood disorders you have.  Any surgeries you have had.  Any medical conditions you have.  Whether you are pregnant or may be pregnant.  What are the risks?  The risks and complications of surgery depend on the specific procedure that you have. Discuss all the risks with your health care providers before your surgery. Ask about common surgical complications, which may include:  Infection.  Bleeding or a need for blood replacement (transfusion).  Allergic reactions to medicines.  Damage to surrounding nerves, tissues, or structures.  A blood clot.  Scarring.  Failure of the surgery to correct the problem.  Follow these instructions before the procedure:  Several days or weeks before your procedure  You may have a physical exam by your primary health care provider to make sure it is safe for you to have surgery.  You may have testing. This may include a chest X-ray, blood and urine tests, electrocardiogram (ECG), or other testing.  Ask your health care provider about:  Changing or stopping your regular medicines. This is especially important if you are taking diabetes medicines or blood thinners.  Taking medicines such as aspirin and ibuprofen. These medicines can thin your blood. Do not take these medicines unless your health care provider tells you to take them.  Taking over-the-counter medicines, vitamins, herbs, and supplements.  Do not use any products that contain nicotine or  tobacco, such as cigarettes and e-cigarettes. If you need help quitting, ask your health care provider.  Avoid alcohol.  Ask your health care provider if there are exercises you can do to prepare for surgery.  Eat a healthy diet.   Plan to have someone 18 years of age or older to take you home from the hospital. We will need to verify your ride on the morning of surgery if you are being discharged home on the same day. Tell your ride to be expecting a call from the hospital prior to your procedure.   Plan to have a responsible adult care for you for at least 24 hours after you leave the hospital or clinic. This is important.  The day before your procedure  You may be given antibiotic medicine to take by mouth to help prevent infection. Take it as told by your health care provider.  You may be asked to shower with a germ-killing soap.  Follow instructions from your health care provider about eating and drinking restrictions. This includes gum, mints and hard candy.  Pack comfortable clothes according to your procedure.   The day of your procedure  You may need to take another shower with a germ-killing soap before you leave home in the morning.  With a small sip of water, take only the medicines that you are told to take.  Remove all jewelry including rings.   Leave anything you consider valuable at home except hearing aids if needed.  You do not need to bring your home medications into the hospital.   Do not wear any makeup, nail polish, powder, deodorant, lotion, hair accessories, or anything on your skin or body except your clothes.  If you will be staying in the hospital, bring a case to hold your glasses, contacts, or dentures. You may also want to bring your robe and non-skid footwear.       (Do not use denture adhesives since you will be asked to remove them during  surgery).   If you wear oxygen at home, bring it with you the day of surgery.  If instructed by your health care provider, bring your sleep apnea  device with you on the day of your surgery (if this applies to you).  You may want to leave your suitcase and sleep apnea device in the car until after surgery.   Arrive at the hospital as scheduled.  Bring a friend or family member with you who can help to answer questions and be present while you meet with your health care provider.  At the hospital  When you arrive at the hospital:  Go to registration located at the main entrance of the hospital. You will be registered and given a beeper and a sticker sheet. Take the stickers to the Outpatient nurses desk and place in the black tray. This is to notify staff that you have arrived. Then return to the lobby to wait.   When your beeper lights up and vibrates proceed through the double doors, under the stairs, and a member of the Outpatient Surgery staff will escort you to your preoperative room.  You may have to wear compression sleeves. These help to prevent blood clots and reduce swelling in your legs.  An IV may be inserted into one of your veins.              In the operating room, you may be given one or more of the following:        A medicine to help you relax (sedative).        A medicine to numb the area (local anesthetic).        A medicine to make you fall asleep (general anesthetic).        A medicine that is injected into an area of your body to numb everything below the                      injection site (regional anesthetic).  You may be given an antibiotic through your IV to help prevent infection.  Your surgical site will be marked or identified.    Contact a health care provider if you:  Develop a fever of more than 100.4°F (38°C) or other feelings of illness during the 48 hours before your surgery.  Have symptoms that get worse.  Have questions or concerns about your surgery.  Summary  Preparing for surgery can make the procedure go more smoothly and lower your risk of complications.  Before surgery, make a list of questions and concerns to  discuss with your surgeon. Ask about the risks and possible complications.  In the days or weeks before your surgery, follow all instructions from your health care provider. You may need to stop smoking, avoid alcohol, follow eating restrictions, and change or stop your regular medicines.  Contact your surgeon if you develop a fever or other signs of illness during the few days before your surgery.  This information is not intended to replace advice given to you by your health care provider. Make sure you discuss any questions you have with your health care provider.  Document Revised: 12/21/2018 Document Reviewed: 10/23/2018  Elsevier Patient Education © 2021 [employer]

## 2023-05-16 NOTE — PAT
OBGYN office notified of absence of sterilization consent. Spoke with MISTY Dasilva RN   
normal (ped)...

## 2023-05-22 ENCOUNTER — TELEPHONE (OUTPATIENT)
Dept: OBSTETRICS AND GYNECOLOGY | Facility: CLINIC | Age: 33
End: 2023-05-22
Payer: COMMERCIAL

## 2023-05-22 NOTE — TELEPHONE ENCOUNTER
Pt called wanting to know if her surgery had been pre authorized yet. Spoke with marielena and they had not started on it yet. Gave pt Jose Alejandro number and she was going to contact her later in the week. BONITA

## 2023-05-26 ENCOUNTER — ANESTHESIA EVENT (OUTPATIENT)
Dept: PERIOP | Facility: HOSPITAL | Age: 33
End: 2023-05-26
Payer: COMMERCIAL

## 2023-05-26 ENCOUNTER — ANESTHESIA (OUTPATIENT)
Dept: PERIOP | Facility: HOSPITAL | Age: 33
End: 2023-05-26
Payer: COMMERCIAL

## 2023-05-26 ENCOUNTER — HOSPITAL ENCOUNTER (OUTPATIENT)
Facility: HOSPITAL | Age: 33
Setting detail: HOSPITAL OUTPATIENT SURGERY
Discharge: HOME | End: 2023-05-26
Attending: OBSTETRICS & GYNECOLOGY | Admitting: OBSTETRICS & GYNECOLOGY
Payer: COMMERCIAL

## 2023-05-26 VITALS
OXYGEN SATURATION: 99 % | RESPIRATION RATE: 16 BRPM | DIASTOLIC BLOOD PRESSURE: 74 MMHG | TEMPERATURE: 98.2 F | SYSTOLIC BLOOD PRESSURE: 121 MMHG | HEART RATE: 87 BPM

## 2023-05-26 DIAGNOSIS — Z30.09 STERILIZATION CONSULT: ICD-10-CM

## 2023-05-26 LAB — B-HCG UR QL: NEGATIVE

## 2023-05-26 PROCEDURE — 88302 TISSUE EXAM BY PATHOLOGIST: CPT | Performed by: OBSTETRICS & GYNECOLOGY

## 2023-05-26 PROCEDURE — 25010000002 DEXAMETHASONE PER 1 MG: Performed by: ANESTHESIOLOGY

## 2023-05-26 PROCEDURE — 25010000002 PROPOFOL 10 MG/ML EMULSION: Performed by: NURSE ANESTHETIST, CERTIFIED REGISTERED

## 2023-05-26 PROCEDURE — 58661 LAPAROSCOPY REMOVE ADNEXA: CPT | Performed by: OBSTETRICS & GYNECOLOGY

## 2023-05-26 PROCEDURE — 25010000002 MIDAZOLAM PER 1 MG: Performed by: ANESTHESIOLOGY

## 2023-05-26 PROCEDURE — 25010000002 LEVOFLOXACIN PER 250 MG: Performed by: OBSTETRICS & GYNECOLOGY

## 2023-05-26 PROCEDURE — 25010000002 FENTANYL CITRATE (PF) 250 MCG/5ML SOLUTION: Performed by: NURSE ANESTHETIST, CERTIFIED REGISTERED

## 2023-05-26 PROCEDURE — 25010000002 DROPERIDOL PER 5 MG: Performed by: NURSE ANESTHETIST, CERTIFIED REGISTERED

## 2023-05-26 PROCEDURE — 25010000002 ONDANSETRON PER 1 MG: Performed by: NURSE ANESTHETIST, CERTIFIED REGISTERED

## 2023-05-26 PROCEDURE — 81025 URINE PREGNANCY TEST: CPT | Performed by: OBSTETRICS & GYNECOLOGY

## 2023-05-26 DEVICE — HEMOST ABS SURGICEL PWDR 3GM: Type: IMPLANTABLE DEVICE | Site: ABDOMEN | Status: FUNCTIONAL

## 2023-05-26 RX ORDER — LABETALOL HYDROCHLORIDE 5 MG/ML
5 INJECTION, SOLUTION INTRAVENOUS
Status: DISCONTINUED | OUTPATIENT
Start: 2023-05-26 | End: 2023-05-26 | Stop reason: HOSPADM

## 2023-05-26 RX ORDER — HYDROCODONE BITARTRATE AND ACETAMINOPHEN 5; 325 MG/1; MG/1
1 TABLET ORAL EVERY 8 HOURS PRN
Qty: 5 TABLET | Refills: 0 | Status: SHIPPED | OUTPATIENT
Start: 2023-05-26

## 2023-05-26 RX ORDER — SODIUM CHLORIDE 0.9 % (FLUSH) 0.9 %
1-10 SYRINGE (ML) INJECTION AS NEEDED
Status: DISCONTINUED | OUTPATIENT
Start: 2023-05-26 | End: 2023-05-26 | Stop reason: HOSPADM

## 2023-05-26 RX ORDER — NALOXONE HCL 0.4 MG/ML
0.4 VIAL (ML) INJECTION AS NEEDED
Status: DISCONTINUED | OUTPATIENT
Start: 2023-05-26 | End: 2023-05-26 | Stop reason: HOSPADM

## 2023-05-26 RX ORDER — FENTANYL CITRATE 50 UG/ML
INJECTION, SOLUTION INTRAMUSCULAR; INTRAVENOUS AS NEEDED
Status: DISCONTINUED | OUTPATIENT
Start: 2023-05-26 | End: 2023-05-26 | Stop reason: SURG

## 2023-05-26 RX ORDER — DROPERIDOL 2.5 MG/ML
INJECTION, SOLUTION INTRAMUSCULAR; INTRAVENOUS AS NEEDED
Status: DISCONTINUED | OUTPATIENT
Start: 2023-05-26 | End: 2023-05-26 | Stop reason: SURG

## 2023-05-26 RX ORDER — IBUPROFEN 200 MG
600 TABLET ORAL EVERY 6 HOURS PRN
Status: DISCONTINUED | OUTPATIENT
Start: 2023-05-26 | End: 2023-05-26 | Stop reason: HOSPADM

## 2023-05-26 RX ORDER — ACETAMINOPHEN 500 MG
1000 TABLET ORAL ONCE
Status: COMPLETED | OUTPATIENT
Start: 2023-05-26 | End: 2023-05-26

## 2023-05-26 RX ORDER — DROPERIDOL 2.5 MG/ML
0.62 INJECTION, SOLUTION INTRAMUSCULAR; INTRAVENOUS ONCE AS NEEDED
Status: DISCONTINUED | OUTPATIENT
Start: 2023-05-26 | End: 2023-05-26 | Stop reason: HOSPADM

## 2023-05-26 RX ORDER — LIDOCAINE HYDROCHLORIDE 10 MG/ML
0.5 INJECTION, SOLUTION EPIDURAL; INFILTRATION; INTRACAUDAL; PERINEURAL ONCE AS NEEDED
Status: DISCONTINUED | OUTPATIENT
Start: 2023-05-26 | End: 2023-05-26 | Stop reason: HOSPADM

## 2023-05-26 RX ORDER — CLINDAMYCIN PHOSPHATE 900 MG/50ML
900 INJECTION, SOLUTION INTRAVENOUS
Status: DISCONTINUED | OUTPATIENT
Start: 2023-05-26 | End: 2023-05-26 | Stop reason: HOSPADM

## 2023-05-26 RX ORDER — MIDAZOLAM HYDROCHLORIDE 1 MG/ML
2 INJECTION INTRAMUSCULAR; INTRAVENOUS
Status: DISCONTINUED | OUTPATIENT
Start: 2023-05-26 | End: 2023-05-26 | Stop reason: HOSPADM

## 2023-05-26 RX ORDER — SODIUM CHLORIDE 9 MG/ML
40 INJECTION, SOLUTION INTRAVENOUS AS NEEDED
Status: DISCONTINUED | OUTPATIENT
Start: 2023-05-26 | End: 2023-05-26 | Stop reason: HOSPADM

## 2023-05-26 RX ORDER — SACCHAROMYCES BOULARDII 250 MG
250 CAPSULE ORAL 2 TIMES DAILY
COMMUNITY

## 2023-05-26 RX ORDER — ATRACURIUM BESYLATE 10 MG/ML
INJECTION, SOLUTION INTRAVENOUS AS NEEDED
Status: DISCONTINUED | OUTPATIENT
Start: 2023-05-26 | End: 2023-05-26 | Stop reason: SURG

## 2023-05-26 RX ORDER — PROMETHAZINE HYDROCHLORIDE 25 MG/1
12.5 TABLET ORAL ONCE AS NEEDED
Status: DISCONTINUED | OUTPATIENT
Start: 2023-05-26 | End: 2023-05-26 | Stop reason: HOSPADM

## 2023-05-26 RX ORDER — ONDANSETRON 2 MG/ML
4 INJECTION INTRAMUSCULAR; INTRAVENOUS ONCE AS NEEDED
Status: DISCONTINUED | OUTPATIENT
Start: 2023-05-26 | End: 2023-05-26 | Stop reason: HOSPADM

## 2023-05-26 RX ORDER — DEXAMETHASONE SODIUM PHOSPHATE 4 MG/ML
4 INJECTION, SOLUTION INTRA-ARTICULAR; INTRALESIONAL; INTRAMUSCULAR; INTRAVENOUS; SOFT TISSUE ONCE AS NEEDED
Status: COMPLETED | OUTPATIENT
Start: 2023-05-26 | End: 2023-05-26

## 2023-05-26 RX ORDER — PROPOFOL 10 MG/ML
VIAL (ML) INTRAVENOUS AS NEEDED
Status: DISCONTINUED | OUTPATIENT
Start: 2023-05-26 | End: 2023-05-26 | Stop reason: SURG

## 2023-05-26 RX ORDER — SODIUM CHLORIDE 9 MG/ML
100 INJECTION, SOLUTION INTRAVENOUS CONTINUOUS
Status: DISCONTINUED | OUTPATIENT
Start: 2023-05-26 | End: 2023-05-26 | Stop reason: HOSPADM

## 2023-05-26 RX ORDER — ONDANSETRON 2 MG/ML
INJECTION INTRAMUSCULAR; INTRAVENOUS AS NEEDED
Status: DISCONTINUED | OUTPATIENT
Start: 2023-05-26 | End: 2023-05-26 | Stop reason: SURG

## 2023-05-26 RX ORDER — FLUMAZENIL 0.1 MG/ML
0.2 INJECTION INTRAVENOUS AS NEEDED
Status: DISCONTINUED | OUTPATIENT
Start: 2023-05-26 | End: 2023-05-26 | Stop reason: HOSPADM

## 2023-05-26 RX ORDER — MAGNESIUM HYDROXIDE 1200 MG/15ML
LIQUID ORAL AS NEEDED
Status: DISCONTINUED | OUTPATIENT
Start: 2023-05-26 | End: 2023-05-26 | Stop reason: HOSPADM

## 2023-05-26 RX ORDER — LIDOCAINE HYDROCHLORIDE 10 MG/ML
0.5 INJECTION, SOLUTION EPIDURAL; INFILTRATION; INTRACAUDAL; PERINEURAL ONCE AS NEEDED
Status: COMPLETED | OUTPATIENT
Start: 2023-05-26 | End: 2023-05-26

## 2023-05-26 RX ORDER — OXYCODONE AND ACETAMINOPHEN 7.5; 325 MG/1; MG/1
2 TABLET ORAL EVERY 4 HOURS PRN
Status: DISCONTINUED | OUTPATIENT
Start: 2023-05-26 | End: 2023-05-26 | Stop reason: HOSPADM

## 2023-05-26 RX ORDER — LIDOCAINE HYDROCHLORIDE 20 MG/ML
INJECTION, SOLUTION EPIDURAL; INFILTRATION; INTRACAUDAL; PERINEURAL AS NEEDED
Status: DISCONTINUED | OUTPATIENT
Start: 2023-05-26 | End: 2023-05-26 | Stop reason: SURG

## 2023-05-26 RX ORDER — OXYCODONE AND ACETAMINOPHEN 10; 325 MG/1; MG/1
1 TABLET ORAL ONCE AS NEEDED
Status: COMPLETED | OUTPATIENT
Start: 2023-05-26 | End: 2023-05-26

## 2023-05-26 RX ORDER — SODIUM CHLORIDE 0.9 % (FLUSH) 0.9 %
3 SYRINGE (ML) INJECTION EVERY 12 HOURS SCHEDULED
Status: DISCONTINUED | OUTPATIENT
Start: 2023-05-26 | End: 2023-05-26 | Stop reason: HOSPADM

## 2023-05-26 RX ORDER — IBUPROFEN 600 MG/1
600 TABLET ORAL ONCE AS NEEDED
Status: DISCONTINUED | OUTPATIENT
Start: 2023-05-26 | End: 2023-05-26 | Stop reason: HOSPADM

## 2023-05-26 RX ORDER — SODIUM CHLORIDE 0.9 % (FLUSH) 0.9 %
3-10 SYRINGE (ML) INJECTION AS NEEDED
Status: DISCONTINUED | OUTPATIENT
Start: 2023-05-26 | End: 2023-05-26 | Stop reason: HOSPADM

## 2023-05-26 RX ORDER — LEVOFLOXACIN 5 MG/ML
500 INJECTION, SOLUTION INTRAVENOUS
Status: COMPLETED | OUTPATIENT
Start: 2023-05-26 | End: 2023-05-26

## 2023-05-26 RX ORDER — SODIUM CHLORIDE 0.9 % (FLUSH) 0.9 %
10 SYRINGE (ML) INJECTION EVERY 12 HOURS SCHEDULED
Status: DISCONTINUED | OUTPATIENT
Start: 2023-05-26 | End: 2023-05-26 | Stop reason: HOSPADM

## 2023-05-26 RX ORDER — SODIUM CHLORIDE 0.9 % (FLUSH) 0.9 %
3 SYRINGE (ML) INJECTION AS NEEDED
Status: DISCONTINUED | OUTPATIENT
Start: 2023-05-26 | End: 2023-05-26 | Stop reason: HOSPADM

## 2023-05-26 RX ORDER — BUPIVACAINE HCL/0.9 % NACL/PF 0.125 %
PLASTIC BAG, INJECTION (ML) EPIDURAL AS NEEDED
Status: DISCONTINUED | OUTPATIENT
Start: 2023-05-26 | End: 2023-05-26 | Stop reason: SURG

## 2023-05-26 RX ORDER — FENTANYL CITRATE 50 UG/ML
25 INJECTION, SOLUTION INTRAMUSCULAR; INTRAVENOUS
Status: DISCONTINUED | OUTPATIENT
Start: 2023-05-26 | End: 2023-05-26 | Stop reason: HOSPADM

## 2023-05-26 RX ORDER — SODIUM CHLORIDE, SODIUM LACTATE, POTASSIUM CHLORIDE, CALCIUM CHLORIDE 600; 310; 30; 20 MG/100ML; MG/100ML; MG/100ML; MG/100ML
100 INJECTION, SOLUTION INTRAVENOUS CONTINUOUS
Status: DISCONTINUED | OUTPATIENT
Start: 2023-05-26 | End: 2023-05-26 | Stop reason: HOSPADM

## 2023-05-26 RX ORDER — SODIUM CHLORIDE, SODIUM LACTATE, POTASSIUM CHLORIDE, CALCIUM CHLORIDE 600; 310; 30; 20 MG/100ML; MG/100ML; MG/100ML; MG/100ML
1000 INJECTION, SOLUTION INTRAVENOUS CONTINUOUS
Status: DISCONTINUED | OUTPATIENT
Start: 2023-05-26 | End: 2023-05-26 | Stop reason: HOSPADM

## 2023-05-26 RX ORDER — HYDROCODONE BITARTRATE AND ACETAMINOPHEN 5; 325 MG/1; MG/1
1 TABLET ORAL ONCE AS NEEDED
Status: DISCONTINUED | OUTPATIENT
Start: 2023-05-26 | End: 2023-05-26 | Stop reason: HOSPADM

## 2023-05-26 RX ADMIN — FENTANYL CITRATE 250 MCG: 50 INJECTION, SOLUTION INTRAMUSCULAR; INTRAVENOUS at 07:46

## 2023-05-26 RX ADMIN — OXYCODONE HYDROCHLORIDE AND ACETAMINOPHEN 1 TABLET: 10; 325 TABLET ORAL at 08:49

## 2023-05-26 RX ADMIN — ATRACURIUM BESYLATE 20 MG: 10 INJECTION, SOLUTION INTRAVENOUS at 07:46

## 2023-05-26 RX ADMIN — LIDOCAINE HYDROCHLORIDE 200 MG: 20 INJECTION, SOLUTION EPIDURAL; INFILTRATION; INTRACAUDAL; PERINEURAL at 07:46

## 2023-05-26 RX ADMIN — DEXAMETHASONE SODIUM PHOSPHATE 4 MG: 4 INJECTION INTRA-ARTICULAR; INTRALESIONAL; INTRAMUSCULAR; INTRAVENOUS; SOFT TISSUE at 07:29

## 2023-05-26 RX ADMIN — LIDOCAINE HYDROCHLORIDE 0.5 ML: 10 INJECTION, SOLUTION EPIDURAL; INFILTRATION; INTRACAUDAL; PERINEURAL at 07:29

## 2023-05-26 RX ADMIN — PROPOFOL 200 MG: 10 INJECTION, EMULSION INTRAVENOUS at 07:46

## 2023-05-26 RX ADMIN — ONDANSETRON 8 MG: 2 INJECTION INTRAMUSCULAR; INTRAVENOUS at 08:08

## 2023-05-26 RX ADMIN — LEVOFLOXACIN 500 MG: 500 INJECTION, SOLUTION INTRAVENOUS at 07:30

## 2023-05-26 RX ADMIN — Medication 100 MCG: at 08:05

## 2023-05-26 RX ADMIN — MIDAZOLAM HYDROCHLORIDE 2 MG: 2 INJECTION, SOLUTION INTRAMUSCULAR; INTRAVENOUS at 07:29

## 2023-05-26 RX ADMIN — ACETAMINOPHEN 1000 MG: 500 TABLET, FILM COATED ORAL at 07:29

## 2023-05-26 RX ADMIN — Medication 100 MCG: at 07:55

## 2023-05-26 RX ADMIN — DROPERIDOL 1.25 MG: 2.5 INJECTION, SOLUTION INTRAMUSCULAR; INTRAVENOUS at 07:46

## 2023-05-26 RX ADMIN — SODIUM CHLORIDE, POTASSIUM CHLORIDE, SODIUM LACTATE AND CALCIUM CHLORIDE 100 ML/HR: 600; 310; 30; 20 INJECTION, SOLUTION INTRAVENOUS at 07:27

## 2023-05-26 NOTE — ANESTHESIA POSTPROCEDURE EVALUATION
Patient: Kaley Huff    Procedure Summary       Date: 05/26/23 Room / Location: [facility] OR  /  [facility] OR    Anesthesia Start: 0743 Anesthesia Stop: 0839    Procedure: SALPINGECTOMY LAPAROSCOPIC for sterilization (Bilateral: Abdomen) Diagnosis:       Sterilization consult      (Sterilization consult [Z30.09])    Surgeons: Arturo Kennedy MD Provider: Homero Irizarry CRNA    Anesthesia Type: general ASA Status: 2            Anesthesia Type: general    Vitals  No vitals data found for the desired time range.          Post Anesthesia Care and Evaluation    Patient location during evaluation: PACU  Patient participation: complete - patient participated  Level of consciousness: awake and alert  Pain management: adequate    Airway patency: patent  Anesthetic complications: No anesthetic complications    Cardiovascular status: acceptable  Respiratory status: acceptable and face mask  Hydration status: acceptable    Comments: Blood pressure 141/100, pulse 84, temperature 97.8 °F (36.6 °C), temperature source Temporal, resp. rate 18, last menstrual period 05/09/2023, SpO2 99 %, not currently breastfeeding.    Pt discharged from PACU based on roderick score >8

## 2023-05-26 NOTE — OP NOTE
[facility]  Kaley Huff  : 1990  MRN: [id number]  [facility]: [id number]  Date: 2023    Operative Note    SALPINGECTOMY LAPAROSCOPIC      Pre-op Diagnosis:  Sterilization consult [Z30.09]   Post-op Diagnosis:  Post-Op Diagnosis Codes:     * Sterilization consult [Z30.09]   Procedure: Procedure(s):  SALPINGECTOMY LAPAROSCOPIC for sterilization   Surgeon: Surgeon(s):  Arturo Kennedy MD       Anesthesia: General   Estimated Blood Loss: Minimal   mls   Fluids: See anesthesia record   mls   UOP: 100 and clear   mls   ABx: Levaquin and Cleocin   Specimens:  Bilateral fallopian tubes   Findings: Normal anatomy with moderate adhestions in the anterior cul de sac   Complications: None      INDICATION: Kaley Huff is a 33 y.o. female with undesired fertility.    PROCEDURE IN DETAIL: After informed consent was obtained, the patient was taken to the operating room, where general anesthesia was administered. She was placed in the lithotomy position in [facility], and her abdomen perineum and vagina were prepped and draped in normal sterile fashion. An open sided speculum was placed in the vagina and her bladder drained with a metal catheter. The anterior lip of the cervix was visualized and grasped with a single toothed tenaculum. An Mems-ID uterine manipulator was attached to the cervix and tenaculum. The speculum was removed. After changing gloves attention was turned to the abdomen.    The infraumbilical skin incision was made in the umbilicus with a scalpel. The subcutaneous tissues were divided bluntly. The Veress needle was placed into the abdominal cavity while tenting the abdominal wall. Correct position was confirmed by water drop test and measurement of gas pressures and flow. Opening pressure was 6 mmHg. The abdomen was insufflated with carbon dioxide gas and the Veress needle removed. A 5mm tissue  blunt trocar was placed into the abdominal cavity while tenting the abdomen. The  abdomen and pelvis were inspected with the findings noted above, there was no sign of injury from entry. 2 accessory ports were placed in the left lower quadrant and suprapubic areas under direct visualization without difficulty or complication. The patient was placed in Trendelenburg position and the bowel swept out of the pelvis with a blunt probe. The left fallopian tube was grasped and elevated. The mesosalpinx was divided with the Harmonic Scalpel and the tube excised all the way to the cornual region. The procedure was repeated with the right tube identically. The tubes were then removed through the left lower quadrant port. The pelvis was inspected.  A minimal amount of oozing was noted in the right cornual region.  Hemostasis was achieved with use of the harmonic scalpel without transecting the tissue.  Insufflation was dropped and this area was inspected and noted to be hemostatic.  For added hemostatic purposes, I placed Surgicel powder on this area.  The accessory ports were removed under direct visualization with hemostasis noted. The laparoscope was removed and the gas allowed to escape the abdomen through the umbilical port. The umbilical port was then removed. The skin incisions were then closed with a subcuticular stitch of 4-0 Monocryl and reinforced with a Steri-Strip.  They were then covered with a sterile dressing. The uterine manipulator was removed.  Hemostasis was noted.    The patient was then awakened and taken to the recovery room in stable condition. She tolerated the procedure well without complications. All sponge, needle, and instrument counts were correct times 3 per the OR staff.      Arturo Kennedy MD   5/26/2023  08:36 CDT

## 2023-05-26 NOTE — ANESTHESIA PREPROCEDURE EVALUATION
Anesthesia Evaluation     Patient summary reviewed   no history of anesthetic complications:   NPO Solid Status: > 8 hours  NPO Liquid Status: > 8 hours           Airway   Mallampati: II  TM distance: >3 FB  No difficulty expected  Dental - normal exam     Pulmonary    (+) asthma,  (-) sleep apnea  Cardiovascular   Exercise tolerance: excellent (>7 METS)    (+) hypertensiondysrhythmias      Neuro/Psych- negative ROS  GI/Hepatic/Renal/Endo - negative ROS     Musculoskeletal (-) negative ROS    Abdominal    Substance History      OB/GYN          Other                        Anesthesia Plan    ASA 2     general     intravenous induction     Anesthetic plan, risks, benefits, and alternatives have been provided, discussed and informed consent has been obtained with: patient.    CODE STATUS:

## 2023-05-30 LAB
CYTO UR: NORMAL
LAB AP CASE REPORT: NORMAL
Lab: NORMAL
PATH REPORT.FINAL DX SPEC: NORMAL
PATH REPORT.GROSS SPEC: NORMAL

## 2023-05-31 ENCOUNTER — TELEPHONE (OUTPATIENT)
Dept: OBSTETRICS AND GYNECOLOGY | Facility: CLINIC | Age: 33
End: 2023-05-31

## 2023-05-31 NOTE — TELEPHONE ENCOUNTER
Pt called for postop as it was not scheduled, had laparoscopic bilateral salpingectomy 05/26/2023, you do not have any openings until the first week of July.  Do you want pt to be double booked in an appt when you get back or see her tomorrow on your short schedule?

## 2023-06-05 ENCOUNTER — OFFICE VISIT (OUTPATIENT)
Dept: OBSTETRICS AND GYNECOLOGY | Facility: CLINIC | Age: 33
End: 2023-06-05
Payer: COMMERCIAL

## 2023-06-05 VITALS
HEIGHT: 64 IN | WEIGHT: 170 LBS | DIASTOLIC BLOOD PRESSURE: 74 MMHG | SYSTOLIC BLOOD PRESSURE: 128 MMHG | BODY MASS INDEX: 29.02 KG/M2

## 2023-06-05 DIAGNOSIS — Z98.890 POST-OPERATIVE STATE: Primary | ICD-10-CM

## 2023-06-05 RX ORDER — MONTELUKAST SODIUM 10 MG/1
TABLET ORAL
COMMUNITY
Start: 2023-05-16

## 2023-06-05 RX ORDER — HYDROCORTISONE ACETATE 25 MG
SUPPOSITORY, RECTAL RECTAL
COMMUNITY
Start: 2023-05-16

## 2023-06-05 RX ORDER — SEMAGLUTIDE 0.5 MG/.5ML
INJECTION, SOLUTION SUBCUTANEOUS
COMMUNITY
Start: 2023-05-16

## 2023-06-05 NOTE — PROGRESS NOTES
"Chief Complaint  Gynecologic Exam (Pt is here with c/o having redness and itching to just one of her incision sites, denies any warmth to touch.  )    Subjective          Kaley Huff presents to [facility]  History of Present Illness    Review of Systems   Skin:         Redness and itching at an incision site       Objective   Vital Signs:   /74   Ht 162.6 cm (64\")   Wt 77.1 kg (170 lb)   BMI 29.18 kg/m²     Physical Exam  Skin:     Comments: Site closed no drainage.  Mild irritation noted around site       Result Review :                       Assessment and Plan       Advised Hiclense daily, keep dry and report any changes.   Pt advised to call with any questions or concerns.   Discussed S&S to report. Pt voiced understanding.       Diagnoses and all orders for this visit:    1. Post-operative state (Primary)          BMI is >= 25 and <30. (Overweight) The following options were offered after discussion;: weight loss educational material (shared in after visit summary)       Follow Up   Return for  Next scheduled follow up.    Patient was given instructions and counseling regarding her condition or for health maintenance advice. Please see specific information pulled into the AVS if appropriate.       "

## 2023-06-08 ENCOUNTER — NURSE TRIAGE (OUTPATIENT)
Dept: CALL CENTER | Facility: HOSPITAL | Age: 33
End: 2023-06-08
Payer: COMMERCIAL

## 2023-06-09 NOTE — TELEPHONE ENCOUNTER
Reason for Disposition   [1] Recent fall AND [2] no injury    Additional Information   Negative: Major injury from dangerous force (e.g., fall > 10 feet or 3 meters)   Negative: [1] Major bleeding (e.g., actively dripping or spurting) AND [2] can't be stopped   Negative: Shock suspected (e.g., cold/pale/clammy skin, too weak to stand)   Negative: Difficult to awaken or acting confused (e.g., disoriented, slurred speech)   Negative: [1] SEVERE weakness (i.e., unable to walk or barely able to walk, requires support) AND [2] new-onset or worsening   Negative: [1] Can't stand (bear weight) or walk AND [2] new-onset after fall   Negative: Sounds like a life-threatening emergency to the triager   Negative: Fainted (passed out)   Negative: New-onset or worsening weakness of the face, arm or leg on one side of the body   Negative: [1] New-onset or worsening dizziness AND [2] described as spinning or off balance (i.e., vertigo)   Negative: [1] New-onset or worsening dizziness AND [2] NO spinning sensation or trouble with balance   Negative: New-onset or worsening pale skin (pallor)   Negative: Pregnant and fall   Negative: Patient has a concerning injury to a specific part of the body (e.g., chest, leg, head)   Negative: Patient has a wound (abrasion, cut, puncture, other skin injury or tear)   Negative: Injury (or injuries) that need emergency care   Negative: Sounds like a serious injury to the triager   Negative: [1] Muscle pain AND [2] dark (cola colored) or red-colored urine   Negative: [1] Unable to get up until help (e.g., caregiver, family, friend) arrived AND [2] on the ground 1 hour or more   Negative: Patient sounds very sick or weak to the triager   Negative: [1] MODERATE weakness (i.e., interferes with work, school, normal activities) AND [2] new-onset or worsening   Negative: [1] Fever > 101 F (38.3 C) AND [2] age > 60 years   Negative: [1] Fever > 100.0 F (37.8 C) AND [2] bedridden (e.g., CVA, chronic illness,  "recovering from surgery)   Negative: [1] Fever > 100.0 F (37.8 C) AND [2] diabetes mellitus or weak immune system (e.g., HIV positive, cancer chemo, splenectomy, organ transplant, chronic steroids)   Negative: Suspicious history for the fall   Negative: [1] Caller has URGENT question AND [2] triager unable to answer question   Negative: [1] No prior tetanus shots (or is not fully vaccinated) AND [2] any wound (e.g., cut or scrape)   Negative: [1] HIV positive or severe immunodeficiency (severely weak immune system) AND [2] DIRTY cut or scrape   Negative: [1] Caller has NON-URGENT question AND [2] triager unable to answer question   Negative: MILD weakness (i.e., does not interfere with ability to work, go to school, normal activities)  (Exception: Mild weakness is a chronic symptom.)   Negative: [1] Last tetanus shot > 5 years ago AND [2] DIRTY cut or scrape   Negative: [1] Last tetanus shot > 10 years ago AND [2] CLEAN cut or scrape (e.g., object AND skin were clean)   Negative: [1] Fall AND [2] went to emergency department for evaluation or treatment   Negative: [1] Fall AND [2] patient seems very anxious and fearful of falling again   Negative: [1] Falling (two or more falls) AND [2] in past year   Negative: Weakness is a chronic symptom (recurrent or ongoing AND present > 4 weeks)   Negative: Dizziness is a chronic symptom (recurrent or ongoing AND present > 4 weeks)    Answer Assessment - Initial Assessment Questions  1. MECHANISM: \"How did the fall happen?\"      Fell down stairs  2. DOMESTIC VIOLENCE AND ELDER ABUSE SCREENING: \"Did you fall because someone pushed you or tried to hurt you?\" If Yes, ask: \"Are you safe now?\"      na  3. ONSET: \"When did the fall happen?\" (e.g., minutes, hours, or days ago)      Just prior to call  4. LOCATION: \"What part of the body hit the ground?\" (e.g., back, buttocks, head, hips, knees, hands, head, stomach)      buttock  5. INJURY: \"Did you hurt (injure) yourself when you " "fell?\" If Yes, ask: \"What did you injure? Tell me more about this?\" (e.g., body area; type of injury; pain severity)\"      deneis  6. PAIN: \"Is there any pain?\" If Yes, ask: \"How bad is the pain?\" (e.g., Scale 1-10; or mild,   moderate, severe)    - NONE (0): No pain    - MILD (1-3): Doesn't interfere with normal activities     - MODERATE (4-7): Interferes with normal activities or awakens from sleep     - SEVERE (8-10): Excruciating pain, unable to do any normal activities       denies  7. SIZE: For cuts, bruises, or swelling, ask: \"How large is it?\" (e.g., inches or centimeters)       na  8. PREGNANCY: \"Is there any chance you are pregnant?\" \"When was your last menstrual period?\"      no  9. OTHER SYMPTOMS: \"Do you have any other symptoms?\" (e.g., dizziness, fever, weakness; new onset or worsening).       none  10. CAUSE: \"What do you think caused the fall (or falling)?\" (e.g., tripped, dizzy spell)        tripped    Protocols used: Falls and Falling-ADULT-AH    "

## 2023-06-16 NOTE — PATIENT INSTRUCTIONS

## 2023-08-23 ENCOUNTER — OFFICE VISIT (OUTPATIENT)
Dept: OBSTETRICS AND GYNECOLOGY | Facility: CLINIC | Age: 33
End: 2023-08-23
Payer: COMMERCIAL

## 2023-08-23 VITALS
WEIGHT: 166 LBS | DIASTOLIC BLOOD PRESSURE: 76 MMHG | SYSTOLIC BLOOD PRESSURE: 124 MMHG | BODY MASS INDEX: 28.34 KG/M2 | HEIGHT: 64 IN

## 2023-08-23 DIAGNOSIS — N94.10 DYSPAREUNIA, FEMALE: ICD-10-CM

## 2023-08-23 DIAGNOSIS — R10.2 PELVIC PAIN: Primary | ICD-10-CM

## 2023-08-23 DIAGNOSIS — N30.10 INTERSTITIAL CYSTITIS: ICD-10-CM

## 2023-08-23 RX ORDER — HYDROCORTISONE 25 MG/G
CREAM TOPICAL 2 TIMES DAILY
Qty: 30 G | Refills: 1 | Status: SHIPPED | OUTPATIENT
Start: 2023-08-23

## 2023-08-23 NOTE — PROGRESS NOTES
"Chief Complaint  Follow-up (Pt here for 6wk f/u on RLQ pain and reports that the pain from potential blood clot/hematoma has resolved, had CT done 07/21/2023 that was normal ) and Interstitial Cystitis (Pt reports based on review of symptoms she may suffer with IC and had a flare up after sx and thinks it may have worsened with the catheter)    Subjective        Kaley Huff presents to [facility]  History of Present Illness    Patient presents today for follow-up  The abdominal pain she was experiencing postoperatively has resolved  CT imaging was normal  After listening to some of her pelvic and urinary symptoms, I was curious for interstitial cystitis  After doing online research, she feels that she may have an underlying case although mild, of interstitial cystitis  Prior to surgery, her interstitial cystitis questionnaire was low  However, since catheterization during her surgery, she scores a 19  We discussed treatment options  She desires dietary modifications    Objective   Vital Signs:  /76 (BP Location: Left arm, Patient Position: Sitting, Cuff Size: Adult)   Ht 162.6 cm (64\")   Wt 75.3 kg (166 lb)   BMI 28.49 kg/mý   Estimated body mass index is 28.49 kg/mý as calculated from the following:    Height as of this encounter: 162.6 cm (64\").    Weight as of this encounter: 75.3 kg (166 lb).               Physical Exam  Vitals and nursing note reviewed. Exam conducted with a chaperone present.   Constitutional:       Appearance: Normal appearance.   HENT:      Head: Normocephalic and atraumatic.   Musculoskeletal:         General: Normal range of motion.   Skin:     General: Skin is warm and dry.   Neurological:      General: No focal deficit present.      Mental Status: She is alert and oriented to person, place, and time. Mental status is at baseline.   Psychiatric:         Mood and Affect: Mood normal.         Behavior: Behavior normal.         Thought Content: Thought " content normal.         Judgment: Judgment normal.      Result Review :                   Assessment and Plan   Diagnoses and all orders for this visit:    1. Pelvic pain (Primary)    2. Dyspareunia, female    3. Interstitial cystitis    Other orders  -     Hydrocortisone, Perianal, (Anusol-HC) 2.5 % rectal cream; Insert  into the rectum 2 (Two) Times a Day. Apply rectally 2 times daily  Dispense: 30 g; Refill: 1             Follow Up   Return in about 3 months (around 11/23/2023) for with me.  Patient was given instructions and counseling regarding her condition or for health maintenance advice. Please see specific information pulled into the AVS if appropriate.       Trial of dietary modifications before proceeding with oral or bladder treatments  Follow-up with primary care for hemorrhoids  Call for concerns or questions    Arturo Kennedy MD

## 2023-10-24 ENCOUNTER — OFFICE VISIT (OUTPATIENT)
Dept: CARDIOLOGY | Facility: CLINIC | Age: 33
End: 2023-10-24
Payer: COMMERCIAL

## 2023-10-24 VITALS
WEIGHT: 172 LBS | SYSTOLIC BLOOD PRESSURE: 138 MMHG | HEIGHT: 64 IN | DIASTOLIC BLOOD PRESSURE: 74 MMHG | BODY MASS INDEX: 29.37 KG/M2 | OXYGEN SATURATION: 98 % | HEART RATE: 75 BPM

## 2023-10-24 DIAGNOSIS — Z82.49 FAMILY HISTORY OF PREMATURE CAD: ICD-10-CM

## 2023-10-24 DIAGNOSIS — Z13.220 SCREENING FOR LIPID DISORDERS: ICD-10-CM

## 2023-10-24 DIAGNOSIS — I10 PRIMARY HYPERTENSION: Chronic | ICD-10-CM

## 2023-10-24 DIAGNOSIS — I47.11 INAPPROPRIATE SINUS TACHYCARDIA: Primary | ICD-10-CM

## 2023-10-24 PROCEDURE — 99214 OFFICE O/P EST MOD 30 MIN: CPT | Performed by: HOSPITALIST

## 2023-10-24 PROCEDURE — 93000 ELECTROCARDIOGRAM COMPLETE: CPT | Performed by: HOSPITALIST

## 2023-10-24 NOTE — PROGRESS NOTES
Reason For Visit:  Inappropriate sinus tachycardia and hypertension    Subjective        Kaley Huff is a 33 y.o. female with the below pertinent PMH who presents for follow-up of inappropriate sinus tachycardia and hypertension.    Patient overall doing well since her last visit.  She feels that her symptoms have remained well controlled on metoprolol, and she has not noticed any significant chest pain, palpitations, shortness of breath, lightheadedness, or peripheral edema.  She has noticed that her HR has trended up a little more towards the 70-80s at rest.  /70's typically at home, but when at work with stress BP gets into 130/80s.  She has not noticed any significant symptom changes with her BP or HR.    ROS: Pertinent findings are included above.    Cardiac Studies  Cardiac monitor 1/2023: Average HR 90 bpm (range  bpm).  No significant arrhythmias or frequent ectopy noted.  Symptoms showed no correlation to abnormal findings.  Tilt table test 1/25/2023: Resting  bpm prior to testing.  With initial tilt phase, BP increased as did HR.  With sublingual nitroglycerin provocation, BP decreased appropriately and HR increased appropriately until minute 5 when it dropped from 157 bpm down to 136 bpm and patient became very symptomatic including dizziness, lightheadedness and nauseated.  Appropriate HR and BP response to the recovery phase.  Findings are consistent with possible vasovagal response.  Echo 2/6/2023: LVEF 56-60%, normal RV size/function, no significant valve disease.    Pertinent PMH  Inappropriate sinus tachycardia  Hypertension  Asthma  Family history of premature CAD (father had CAD in his early 40s as well as a thoracic aortic aneurysm)    Pertinent past medical, surgical, family, and social history were reviewed.      Current Outpatient Medications:     Anucort-HC 25 MG suppository, , Disp: , Rfl:     cetirizine (zyrTEC) 10 MG tablet, Take 1 tablet by mouth 2 (Two) Times a  "Day., Disp: , Rfl:     Hydrocortisone, Perianal, (Anusol-HC) 2.5 % rectal cream, Insert  into the rectum 2 (Two) Times a Day. Apply rectally 2 times daily, Disp: 30 g, Rfl: 1    levalbuterol (XOPENEX HFA) 45 MCG/ACT inhaler, Inhale 1-2 puffs Every 4 (Four) Hours As Needed for Wheezing., Disp: , Rfl:     Loratadine 10 MG capsule, Take 1 capsule by mouth Daily., Disp: , Rfl:     melatonin 5 MG tablet tablet, Take 12 mg by mouth Every Night., Disp: , Rfl:     metoprolol succinate XL (TOPROL-XL) 50 MG 24 hr tablet, Take 1 tablet by mouth Daily., Disp: 90 tablet, Rfl: 3    montelukast (SINGULAIR) 10 MG tablet, , Disp: , Rfl:     saccharomyces boulardii (FLORASTOR) 250 MG capsule, Take 1 capsule by mouth 2 (Two) Times a Day., Disp: , Rfl:     Triamcinolone Acetonide (NASACORT) 55 MCG/ACT nasal inhaler, 2 sprays into the nostril(s) as directed by provider 1 (One) Time As Needed., Disp: , Rfl:      Objective   Vital Signs:  /74   Pulse 75   Ht 162.6 cm (64\")   Wt 78 kg (172 lb)   SpO2 98%   BMI 29.52 kg/m²   Estimated body mass index is 29.52 kg/m² as calculated from the following:    Height as of this encounter: 162.6 cm (64\").    Weight as of this encounter: 78 kg (172 lb).      Constitutional:       Appearance: Healthy appearance. Not in distress.   Neck:      Vascular: JVD normal.   Pulmonary:      Effort: Pulmonary effort is normal.      Breath sounds: Normal breath sounds.   Cardiovascular:      Normal rate. Regular rhythm.      Murmurs: There is no murmur.      No gallop.  No click. No rub.   Edema:     Peripheral edema absent.   Abdominal:      General: There is no distension.      Palpations: Abdomen is soft.      Tenderness: There is no abdominal tenderness.   Skin:     General: Skin is warm and dry.   Neurological:      Mental Status: Alert and oriented to person, place and time.        Result Review :           ECG 12 Lead    Date/Time: 10/24/2023 11:06 AM  Performed by: Gonzalo Hopkins, " MD    Authorized by: Gonzalo Hopkins MD  Comparison: compared with previous ECG from 10/24/2023  Similar to previous ECG  Rhythm: sinus rhythm  Rate: normal  Conduction: conduction normal  QRS axis: normal    Clinical impression: normal ECG            Assessment and Plan   Diagnoses and all orders for this visit:    1. Inappropriate sinus tachycardia (Primary)  -     ECG 12 Lead    2. Primary hypertension    3. Family history of premature CAD  -     Lipid Panel; Future  -     Lipoprotein A (LPA); Future    4. Screening for lipid disorders  -     Lipid Panel; Future      Symptomatically doing well.  She is going to try taking metoprolol in the morning given that she has noticed a slight increase in HR and BP during the day.  She will continue to monitor her BP closely with plan to consider increasing metoprolol dose versus adding low-dose lisinopril back if BP becoming more elevated.  Obtaining further lipid evaluation given her family history of premature CAD.    Follow Up   Return in about 6 months (around 4/24/2024).  Patient was given instructions and counseling regarding her condition or for health maintenance advice. Please see specific information pulled into the AVS if appropriate.       EMR Dragon/Transcription disclaimer: Much of this encounter note is an electronic transcription/translation of spoken language to printed text. The electronic translation of spoken language may permit erroneous, or at times, nonsensical words or phrases to be inadvertently transcribed; although I have reviewed the note for such errors, some may still exist.

## 2023-10-30 ENCOUNTER — OFFICE VISIT (OUTPATIENT)
Age: 33
End: 2023-10-30
Payer: COMMERCIAL

## 2023-10-30 ENCOUNTER — HOSPITAL ENCOUNTER (EMERGENCY)
Age: 33
Discharge: HOME | End: 2023-10-30
Payer: COMMERCIAL

## 2023-10-30 VITALS
DIASTOLIC BLOOD PRESSURE: 80 MMHG | TEMPERATURE: 98.9 F | WEIGHT: 174 LBS | BODY MASS INDEX: 29.87 KG/M2 | RESPIRATION RATE: 20 BRPM | OXYGEN SATURATION: 96 % | HEART RATE: 110 BPM | SYSTOLIC BLOOD PRESSURE: 128 MMHG

## 2023-10-30 VITALS
DIASTOLIC BLOOD PRESSURE: 75 MMHG | RESPIRATION RATE: 16 BRPM | OXYGEN SATURATION: 99 % | TEMPERATURE: 98.4 F | HEART RATE: 98 BPM | SYSTOLIC BLOOD PRESSURE: 126 MMHG

## 2023-10-30 DIAGNOSIS — L08.9 STAPH SKIN INFECTION: Primary | ICD-10-CM

## 2023-10-30 DIAGNOSIS — T78.40XA ALLERGIC REACTION, INITIAL ENCOUNTER: Primary | ICD-10-CM

## 2023-10-30 DIAGNOSIS — B95.8 STAPH SKIN INFECTION: Primary | ICD-10-CM

## 2023-10-30 DIAGNOSIS — L03.115 CELLULITIS OF RIGHT LOWER EXTREMITY: ICD-10-CM

## 2023-10-30 LAB
ALBUMIN SERPL-MCNC: 4.9 G/DL (ref 3.5–5.2)
ALP SERPL-CCNC: 11 U/L (ref 35–104)
ALT SERPL-CCNC: 30 U/L (ref 5–33)
ANION GAP SERPL CALCULATED.3IONS-SCNC: 11 MMOL/L (ref 7–19)
AST SERPL-CCNC: 26 U/L (ref 5–32)
BASOPHILS # BLD: 0 K/UL (ref 0–0.2)
BASOPHILS NFR BLD: 0.3 % (ref 0–1)
BILIRUB SERPL-MCNC: 0.3 MG/DL (ref 0.2–1.2)
BUN SERPL-MCNC: 9 MG/DL (ref 6–20)
CALCIUM SERPL-MCNC: 9.5 MG/DL (ref 8.6–10)
CHLORIDE SERPL-SCNC: 103 MMOL/L (ref 98–111)
CO2 SERPL-SCNC: 26 MMOL/L (ref 22–29)
CREAT SERPL-MCNC: 0.6 MG/DL (ref 0.5–0.9)
EOSINOPHIL # BLD: 0.1 K/UL (ref 0–0.6)
EOSINOPHIL NFR BLD: 0.6 % (ref 0–5)
ERYTHROCYTE [DISTWIDTH] IN BLOOD BY AUTOMATED COUNT: 12.3 % (ref 11.5–14.5)
GLUCOSE SERPL-MCNC: 102 MG/DL (ref 74–109)
HCT VFR BLD AUTO: 39.3 % (ref 37–47)
HGB BLD-MCNC: 13.3 G/DL (ref 12–16)
IMM GRANULOCYTES # BLD: 0 K/UL
LYMPHOCYTES # BLD: 1.4 K/UL (ref 1.1–4.5)
LYMPHOCYTES NFR BLD: 12.1 % (ref 20–40)
MCH RBC QN AUTO: 31.2 PG (ref 27–31)
MCHC RBC AUTO-ENTMCNC: 33.8 G/DL (ref 33–37)
MCV RBC AUTO: 92.3 FL (ref 81–99)
MONOCYTES # BLD: 0.4 K/UL (ref 0–0.9)
MONOCYTES NFR BLD: 3.4 % (ref 0–10)
NEUTROPHILS # BLD: 9.6 K/UL (ref 1.5–7.5)
NEUTS SEG NFR BLD: 83.3 % (ref 50–65)
PLATELET # BLD AUTO: 272 K/UL (ref 130–400)
PMV BLD AUTO: 11.4 FL (ref 9.4–12.3)
POTASSIUM SERPL-SCNC: 4.2 MMOL/L (ref 3.5–5)
PROT SERPL-MCNC: 8.3 G/DL (ref 6.6–8.7)
RBC # BLD AUTO: 4.26 M/UL (ref 4.2–5.4)
SODIUM SERPL-SCNC: 140 MMOL/L (ref 136–145)
WBC # BLD AUTO: 11.5 K/UL (ref 4.8–10.8)

## 2023-10-30 PROCEDURE — 99213 OFFICE O/P EST LOW 20 MIN: CPT | Performed by: NURSE PRACTITIONER

## 2023-10-30 PROCEDURE — 6360000002 HC RX W HCPCS: Performed by: NURSE PRACTITIONER

## 2023-10-30 PROCEDURE — 80053 COMPREHEN METABOLIC PANEL: CPT

## 2023-10-30 PROCEDURE — 2580000003 HC RX 258: Performed by: NURSE PRACTITIONER

## 2023-10-30 PROCEDURE — 99284 EMERGENCY DEPT VISIT MOD MDM: CPT

## 2023-10-30 PROCEDURE — 96375 TX/PRO/DX INJ NEW DRUG ADDON: CPT

## 2023-10-30 PROCEDURE — 2500000003 HC RX 250 WO HCPCS: Performed by: NURSE PRACTITIONER

## 2023-10-30 PROCEDURE — 85025 COMPLETE CBC W/AUTO DIFF WBC: CPT

## 2023-10-30 PROCEDURE — 36415 COLL VENOUS BLD VENIPUNCTURE: CPT

## 2023-10-30 PROCEDURE — 96374 THER/PROPH/DIAG INJ IV PUSH: CPT

## 2023-10-30 RX ORDER — DOXYCYCLINE HYCLATE 100 MG
100 TABLET ORAL 2 TIMES DAILY
Qty: 20 TABLET | Refills: 0 | Status: SHIPPED | OUTPATIENT
Start: 2023-10-30 | End: 2023-10-30

## 2023-10-30 RX ORDER — METOPROLOL SUCCINATE 50 MG/1
50 TABLET, EXTENDED RELEASE ORAL DAILY
COMMUNITY
Start: 2023-10-07

## 2023-10-30 RX ORDER — DOXYCYCLINE HYCLATE 100 MG
100 TABLET ORAL 2 TIMES DAILY
Qty: 20 TABLET | Refills: 0 | Status: SHIPPED | OUTPATIENT
Start: 2023-10-30 | End: 2023-11-09

## 2023-10-30 RX ORDER — PREDNISONE 20 MG/1
50 TABLET ORAL DAILY
Qty: 13 TABLET | Refills: 0 | Status: SHIPPED | OUTPATIENT
Start: 2023-10-30 | End: 2023-11-04

## 2023-10-30 RX ORDER — SULFAMETHOXAZOLE AND TRIMETHOPRIM 800; 160 MG/1; MG/1
1 TABLET ORAL 2 TIMES DAILY
Qty: 20 TABLET | Refills: 0 | Status: SHIPPED | OUTPATIENT
Start: 2023-10-30 | End: 2023-10-30

## 2023-10-30 RX ADMIN — SODIUM CHLORIDE, PRESERVATIVE FREE 20 MG: 5 INJECTION INTRAVENOUS at 14:36

## 2023-10-30 RX ADMIN — METHYLPREDNISOLONE SODIUM SUCCINATE 60 MG: 125 INJECTION, POWDER, FOR SOLUTION INTRAMUSCULAR; INTRAVENOUS at 14:36

## 2023-10-30 ASSESSMENT — VISUAL ACUITY: OU: 1

## 2023-10-30 ASSESSMENT — ENCOUNTER SYMPTOMS
CHEST TIGHTNESS: 0
VOMITING: 0
SHORTNESS OF BREATH: 0
NAUSEA: 0
VOMITING: 0
DIARRHEA: 0
RESPIRATORY NEGATIVE: 1
ABDOMINAL PAIN: 0
FACIAL SWELLING: 0
NAUSEA: 0
ALLERGIC/IMMUNOLOGIC NEGATIVE: 1
COLOR CHANGE: 1
TROUBLE SWALLOWING: 0
SORE THROAT: 1
VOICE CHANGE: 0
COUGH: 0

## 2023-10-30 NOTE — ED TRIAGE NOTES
Pt placed on bactrim today and took first dose. Approximately one hour later pt began to have difficulty swallowing and stated her airway began to swell. Pt took benadryl x2 and came to ER.  Pt is no apparent distress upon arrival

## 2023-10-30 NOTE — PATIENT INSTRUCTIONS
Good hand hygiene  Wash areas to right calf with soap and water daily and apply thin layer of Bactroban to area as directed  Bactrim as directed  Follow up with PCP or return to Urgent Care for worsening or unresolved symptoms.

## 2023-10-30 NOTE — PROGRESS NOTES
[address]  [address] [place] [address]  Dept: [phone]  Dept Fax: [phone]  Loc: [phone]    Joseph Mims is a 26 y.o. female who presents today for her medical conditions/complaintsas noted below. Joseph Mims is c/o of Leg Problem (Right calf, shaved with dull razors 3 months ago was a small bump now have gotten worse )        HPI:     Skin Problem  This is a new problem. The current episode started in the past 7 days. The problem is unchanged. Location: Posterior right calf. The rash is characterized by redness and pain. It is unknown if there was an exposure to a precipitant. Pertinent negatives include no diarrhea, fever or vomiting. Past treatments include nothing. The treatment provided no relief. King Vazquez is here with 2 areas to the back of her right calf which are red and sore to touch. The largest one had a head on it and she popped the lesion and tells me pus came out of it. She now has another lesion above it similar in appearance. She denies fever or chills. She has put band-aids over the area and it has caused the skin to become red around the lesions.    Past Medical History:   Diagnosis Date    Hypertension     Sinus tachycardia      Past Surgical History:   Procedure Laterality Date     SECTION  [id number]    X's 2       Family History   Problem Relation Age of Onset    High Blood Pressure Mother     Other Father         Cardiovascualr Disease    High Blood Pressure Father        Social History     Tobacco Use    Smoking status: Never    Smokeless tobacco: Never   Substance Use Topics    Alcohol use: No      Current Outpatient Medications   Medication Sig Dispense Refill    metoprolol succinate (TOPROL XL) 50 MG extended release tablet Take 1 tablet by mouth daily      sulfamethoxazole-trimethoprim (BACTRIM DS;SEPTRA DS) 800-160 MG per tablet Take 1 tablet by mouth 2 times daily for 10 days 20

## 2023-11-11 ENCOUNTER — OFFICE VISIT (OUTPATIENT)
Age: 33
End: 2023-11-11
Payer: COMMERCIAL

## 2023-11-11 VITALS
SYSTOLIC BLOOD PRESSURE: 120 MMHG | BODY MASS INDEX: 29.66 KG/M2 | DIASTOLIC BLOOD PRESSURE: 68 MMHG | HEART RATE: 97 BPM | TEMPERATURE: 97.6 F | WEIGHT: 172.8 LBS | RESPIRATION RATE: 18 BRPM | OXYGEN SATURATION: 100 %

## 2023-11-11 DIAGNOSIS — R21 RASH AND NONSPECIFIC SKIN ERUPTION: Primary | ICD-10-CM

## 2023-11-11 PROCEDURE — 99213 OFFICE O/P EST LOW 20 MIN: CPT | Performed by: NURSE PRACTITIONER

## 2023-11-11 RX ORDER — TRIAMCINOLONE ACETONIDE 1 MG/G
CREAM TOPICAL
Qty: 45 G | Refills: 0 | Status: SHIPPED | OUTPATIENT
Start: 2023-11-11

## 2023-11-11 RX ORDER — DOXYCYCLINE HYCLATE 100 MG/1
100 CAPSULE ORAL 2 TIMES DAILY
COMMUNITY
Start: 2023-11-06

## 2023-11-11 ASSESSMENT — ENCOUNTER SYMPTOMS
EYES NEGATIVE: 1
GASTROINTESTINAL NEGATIVE: 1
RESPIRATORY NEGATIVE: 1
ALLERGIC/IMMUNOLOGIC NEGATIVE: 1

## 2023-12-02 ENCOUNTER — HOSPITAL ENCOUNTER (OUTPATIENT)
Dept: GENERAL RADIOLOGY | Age: 33
End: 2023-12-02
Payer: COMMERCIAL

## 2023-12-02 ENCOUNTER — OFFICE VISIT (OUTPATIENT)
Age: 33
End: 2023-12-02
Payer: COMMERCIAL

## 2023-12-02 VITALS
BODY MASS INDEX: 30.56 KG/M2 | OXYGEN SATURATION: 99 % | RESPIRATION RATE: 18 BRPM | HEIGHT: 64 IN | DIASTOLIC BLOOD PRESSURE: 82 MMHG | WEIGHT: 179 LBS | SYSTOLIC BLOOD PRESSURE: 132 MMHG | HEART RATE: 88 BPM | TEMPERATURE: 98.1 F

## 2023-12-02 DIAGNOSIS — R05.1 ACUTE COUGH: ICD-10-CM

## 2023-12-02 DIAGNOSIS — J45.41 ASTHMA EXACERBATION, NON-ALLERGIC, MODERATE PERSISTENT: Primary | ICD-10-CM

## 2023-12-02 DIAGNOSIS — J45.41 ASTHMA EXACERBATION, NON-ALLERGIC, MODERATE PERSISTENT: ICD-10-CM

## 2023-12-02 DIAGNOSIS — R06.02 SHORTNESS OF BREATH: ICD-10-CM

## 2023-12-02 DIAGNOSIS — R09.89 CHEST CONGESTION: ICD-10-CM

## 2023-12-02 LAB — SARS-COV-2 N GENE RESP QL NAA+PROBE: NOT DETECTED

## 2023-12-02 PROCEDURE — 99213 OFFICE O/P EST LOW 20 MIN: CPT

## 2023-12-02 PROCEDURE — 71046 X-RAY EXAM CHEST 2 VIEWS: CPT

## 2023-12-02 RX ORDER — ALBUTEROL SULFATE 2.5 MG/3ML
2.5 SOLUTION RESPIRATORY (INHALATION) 4 TIMES DAILY PRN
Qty: 120 EACH | Refills: 0 | Status: SHIPPED | OUTPATIENT
Start: 2023-12-02

## 2023-12-02 RX ORDER — BROMPHENIRAMINE MALEATE, PSEUDOEPHEDRINE HYDROCHLORIDE, AND DEXTROMETHORPHAN HYDROBROMIDE 2; 30; 10 MG/5ML; MG/5ML; MG/5ML
5 SYRUP ORAL 4 TIMES DAILY PRN
Qty: 100 ML | Refills: 0 | Status: SHIPPED | OUTPATIENT
Start: 2023-12-02 | End: 2023-12-07

## 2023-12-02 RX ORDER — DEXAMETHASONE SODIUM PHOSPHATE 10 MG/ML
10 INJECTION INTRAMUSCULAR; INTRAVENOUS ONCE
Status: COMPLETED | OUTPATIENT
Start: 2023-12-02 | End: 2023-12-02

## 2023-12-02 RX ADMIN — DEXAMETHASONE SODIUM PHOSPHATE 10 MG: 10 INJECTION INTRAMUSCULAR; INTRAVENOUS at 08:05

## 2023-12-02 ASSESSMENT — ENCOUNTER SYMPTOMS
ABDOMINAL PAIN: 0
TROUBLE SWALLOWING: 0
SINUS PAIN: 0
VOMITING: 0
FACIAL SWELLING: 0
STRIDOR: 0
ABDOMINAL DISTENTION: 0
SHORTNESS OF BREATH: 1
SORE THROAT: 0
EYE REDNESS: 0
WHEEZING: 0
EYE DISCHARGE: 0
EYE PAIN: 0
CHEST TIGHTNESS: 0
DIARRHEA: 0
NAUSEA: 0
SINUS PRESSURE: 0
COLOR CHANGE: 0
CHOKING: 0
CONSTIPATION: 0
COUGH: 1
APNEA: 0

## 2023-12-02 NOTE — PROGRESS NOTES
[address]  [address] [place] [address]  Dept: [phone]  Dept Fax: [phone]  Loc: [phone]    Kory Hall is a 26 y.o. female who presents today for her medical conditions/complaints as noted below. Kory Hall is complaining of Shortness of Breath (Possible asthma flare up. Coughed up clear fluid to the point of throwing up. About a year ago, had a staph infection from nail salon on . Finished abx's Monday.)        HPI:   Patient presents to the clinic today with complaints of intermittent shortness of breath, cough, and chest congestion that started yesterday. She reports history of asthma and has an albuterol inhaler but she says it is not helping. She says that she had 3-4 episodes yesterday that started with a cough and led to shortness of breath, lasting about an hour each time. She says she used albuterol inhaler twice yesterday. Denies fever, body aches, chills. No other alleviating factors reported. Symptoms moderate. Patient also reports she had COVID about 1-2 months ago and recently finished rounds of doxycycline and clindamycin as prescribed for skin infection. stable    Shortness of Breath  Pertinent negatives include no abdominal pain, chest pain, ear pain, fever, headaches, rash, sore throat, vomiting or wheezing.        Past Medical History:   Diagnosis Date    Hypertension     Sinus tachycardia        Past Surgical History:   Procedure Laterality Date     SECTION  [id number]    X's 2       Family History   Problem Relation Age of Onset    High Blood Pressure Mother     Other Father         Cardiovascualr Disease    High Blood Pressure Father        Social History     Tobacco Use    Smoking status: Never    Smokeless tobacco: Never   Substance Use Topics    Alcohol use: No        Current Outpatient Medications   Medication Sig Dispense Refill    albuterol (PROVENTIL) (2.5 MG/3ML)

## 2023-12-02 NOTE — PATIENT INSTRUCTIONS
Chest x-ray ordered.   We will call with results    Dexamethasone 10 mg injection given in office today    Neb machine ordered and given to patient today    Albuterol nebs prescribed; use as needed for shortness of breath    Bromfed as needed for cough    Advised patient to follow up with PCP    Report to ER if symptoms become severe

## 2023-12-02 NOTE — PROGRESS NOTES
Medication was administered by Duane Kay, MA at 8:05 AM.    Medication: dexamethasone  Amount: 10mg  Route: intramuscular  Site: Dorsogluteal right    Patient tolerated well.

## 2023-12-03 ENCOUNTER — TELEPHONE (OUTPATIENT)
Age: 33
End: 2023-12-03

## 2023-12-15 ENCOUNTER — OFFICE VISIT (OUTPATIENT)
Age: 33
End: 2023-12-15
Payer: COMMERCIAL

## 2023-12-15 ENCOUNTER — LAB (OUTPATIENT)
Dept: LAB | Facility: HOSPITAL | Age: 33
End: 2023-12-15
Payer: COMMERCIAL

## 2023-12-15 VITALS
WEIGHT: 179 LBS | OXYGEN SATURATION: 98 % | SYSTOLIC BLOOD PRESSURE: 110 MMHG | TEMPERATURE: 98.8 F | HEART RATE: 88 BPM | BODY MASS INDEX: 30.73 KG/M2 | DIASTOLIC BLOOD PRESSURE: 68 MMHG | RESPIRATION RATE: 20 BRPM

## 2023-12-15 DIAGNOSIS — R05.9 COUGH, UNSPECIFIED TYPE: ICD-10-CM

## 2023-12-15 DIAGNOSIS — Z13.220 SCREENING FOR LIPID DISORDERS: ICD-10-CM

## 2023-12-15 DIAGNOSIS — J01.90 ACUTE NON-RECURRENT SINUSITIS, UNSPECIFIED LOCATION: Primary | ICD-10-CM

## 2023-12-15 DIAGNOSIS — J02.9 SORE THROAT: ICD-10-CM

## 2023-12-15 DIAGNOSIS — Z82.49 FAMILY HISTORY OF PREMATURE CAD: ICD-10-CM

## 2023-12-15 LAB
CHOLEST SERPL-MCNC: 227 MG/DL (ref 0–200)
HDLC SERPL-MCNC: 43 MG/DL (ref 40–60)
INFLUENZA A ANTIBODY: NORMAL
INFLUENZA B ANTIBODY: NORMAL
LDLC SERPL CALC-MCNC: 158 MG/DL (ref 0–100)
LDLC/HDLC SERPL: 3.62 {RATIO}
S PYO AG THROAT QL: NORMAL
SARS-COV-2 N GENE RESP QL NAA+PROBE: DETECTED
TRIGL SERPL-MCNC: 141 MG/DL (ref 0–150)
VLDLC SERPL-MCNC: 26 MG/DL (ref 5–40)

## 2023-12-15 PROCEDURE — 99214 OFFICE O/P EST MOD 30 MIN: CPT | Performed by: PHYSICIAN ASSISTANT

## 2023-12-15 PROCEDURE — 80061 LIPID PANEL: CPT

## 2023-12-15 PROCEDURE — 83695 ASSAY OF LIPOPROTEIN(A): CPT

## 2023-12-15 PROCEDURE — 36415 COLL VENOUS BLD VENIPUNCTURE: CPT

## 2023-12-15 RX ORDER — CEFDINIR 250 MG/5ML
250 POWDER, FOR SUSPENSION ORAL 2 TIMES DAILY
Qty: 100 ML | Refills: 0 | Status: SHIPPED | OUTPATIENT
Start: 2023-12-15 | End: 2023-12-25

## 2023-12-15 RX ORDER — FLUTICASONE PROPIONATE 50 MCG
2 SPRAY, SUSPENSION (ML) NASAL AS NEEDED
Qty: 16 G | Refills: 0 | Status: SHIPPED | OUTPATIENT
Start: 2023-12-15

## 2023-12-15 NOTE — PATIENT INSTRUCTIONS
-Take full course of antibiotics  -Patient states she has had an allergic reaction to multiple antibiotics. Bactrim causes anaphylactic type symptoms but the amoxicillin, penecillin, cephalosporins, erythromycin just cause her an aggravating rash. Her PCP thinks it may be due an additive in the pills and that a liquid might not cause the same type of reaction. Prescribed liquid cefdinir and explained to patient to take claritin or zyrtec and benadryl at night for itchy rash. Instructed to watch for any further anaphylactic type symptoms and to return to doctor immediately if they start. -Monitor for fever and treat as needed with tylenol or ibuprofen  -Recommended throat lozenges as needed and salt water gargles three times daily  -The patient is to follow up with PCP or return to clinic if symptoms worsen/fail to improve.

## 2023-12-15 NOTE — PROGRESS NOTES
Previously tested for COVID-19? Answer:   Yes    COVID-19     Ordered by an unspecified provider      COVID-19     Ordered by an unspecified provider      POCT Influenza A/B    POCT rapid strep A       Results for orders placed or performed in visit on 12/15/23   POCT Influenza A/B   Result Value Ref Range    Influenza A Ab neg     Influenza B Ab neg    POCT rapid strep A   Result Value Ref Range    Strep A Ag None Detected None Detected       Orders Placed This Encounter   Medications    fluticasone (FLONASE) 50 MCG/ACT nasal spray     Si sprays by Each Nostril route as needed for Rhinitis     Dispense:  16 g     Refill:  0    cefdinir (OMNICEF) 250 MG/5ML suspension     Sig: Take 5 mLs by mouth 2 times daily for 10 days     Dispense:  100 mL     Refill:  0      New Prescriptions    CEFDINIR (OMNICEF) 250 MG/5ML SUSPENSION    Take 5 mLs by mouth 2 times daily for 10 days    FLUTICASONE (FLONASE) 50 MCG/ACT NASAL SPRAY    2 sprays by Each Nostril route as needed for Rhinitis        Return if symptoms worsen or fail to improve. Discussed use, benefits, and side effects of any prescribed medications. All patient questions were answered. Patient voiced understanding of care plan. Patient was given educational materials - see patient instructions below. Patient Instructions   -Take full course of antibiotics  -Patient states she has had an allergic reaction to multiple antibiotics. Bactrim causes anaphylactic type symptoms but the amoxicillin, penecillin, cephalosporins, erythromycin just cause her an aggravating rash. Her PCP thinks it may be due an additive in the pills and that a liquid might not cause the same type of reaction. Prescribed liquid cefdinir and explained to patient to take claritin or zyrtec and benadryl at night for itchy rash. Instructed to watch for any further anaphylactic type symptoms and to return to doctor immediately if they start.   -Monitor for fever and treat as needed

## 2023-12-18 LAB — LPA SERPL-SCNC: <8.4 NMOL/L

## 2023-12-19 RX ORDER — ATORVASTATIN CALCIUM 10 MG/1
10 TABLET, FILM COATED ORAL DAILY
Qty: 90 TABLET | Refills: 3 | Status: SHIPPED | OUTPATIENT
Start: 2023-12-19

## 2023-12-19 NOTE — PROGRESS NOTES
Pt took the 23 and me genetic test and it stated she has [id number] drug metabolism to plavix and wanted to male sure it was not in the same family but if not she is okay with the medication being sent in.

## 2024-01-17 ENCOUNTER — OFFICE VISIT (OUTPATIENT)
Age: 34
End: 2024-01-17

## 2024-01-17 VITALS
WEIGHT: 177.6 LBS | TEMPERATURE: 98.6 F | SYSTOLIC BLOOD PRESSURE: 118 MMHG | HEIGHT: 64 IN | BODY MASS INDEX: 30.32 KG/M2 | OXYGEN SATURATION: 98 % | RESPIRATION RATE: 20 BRPM | DIASTOLIC BLOOD PRESSURE: 70 MMHG | HEART RATE: 97 BPM

## 2024-01-17 DIAGNOSIS — J45.901 MODERATE ASTHMA WITH ACUTE EXACERBATION, UNSPECIFIED WHETHER PERSISTENT: Primary | ICD-10-CM

## 2024-01-17 DIAGNOSIS — R06.2 WHEEZING ON AUSCULTATION: ICD-10-CM

## 2024-01-17 DIAGNOSIS — R09.81 CHRONIC NASAL CONGESTION: ICD-10-CM

## 2024-01-17 LAB
INFLUENZA A ANTIBODY: NEGATIVE
INFLUENZA B ANTIBODY: NEGATIVE
S PYO AG THROAT QL: NORMAL

## 2024-01-17 RX ORDER — BROMPHENIRAMINE MALEATE, PSEUDOEPHEDRINE HYDROCHLORIDE, AND DEXTROMETHORPHAN HYDROBROMIDE 2; 30; 10 MG/5ML; MG/5ML; MG/5ML
10 SYRUP ORAL 4 TIMES DAILY PRN
Qty: 200 ML | Refills: 0 | Status: SHIPPED | OUTPATIENT
Start: 2024-01-17 | End: 2024-01-22

## 2024-01-17 RX ORDER — DEXAMETHASONE SODIUM PHOSPHATE 10 MG/ML
10 INJECTION INTRAMUSCULAR; INTRAVENOUS ONCE
Status: COMPLETED | OUTPATIENT
Start: 2024-01-17 | End: 2024-01-17

## 2024-01-17 RX ORDER — PREDNISONE 10 MG/1
10 TABLET ORAL 2 TIMES DAILY
Qty: 10 TABLET | Refills: 0 | Status: SHIPPED | OUTPATIENT
Start: 2024-01-17 | End: 2024-01-22

## 2024-01-17 RX ADMIN — DEXAMETHASONE SODIUM PHOSPHATE 10 MG: 10 INJECTION INTRAMUSCULAR; INTRAVENOUS at 10:53

## 2024-01-17 ASSESSMENT — ENCOUNTER SYMPTOMS
BLOOD IN STOOL: 0
SORE THROAT: 0
DIARRHEA: 0
SHORTNESS OF BREATH: 0
NAUSEA: 0
VOMITING: 0
CONSTIPATION: 0
RHINORRHEA: 0
EYE DISCHARGE: 0
COUGH: 1
SINUS PRESSURE: 0
COLOR CHANGE: 0
EYE ITCHING: 0
WHEEZING: 1
ABDOMINAL PAIN: 0

## 2024-01-17 NOTE — PROGRESS NOTES
[facility]  [facility]  [address] [place] [id number]  Dept: [phone]  Dept Fax: [phone]  Loc: [phone]    Guillermina Beard is a 33 y.o. female who presents today for her medical conditions/complaints as noted below.  Guillermina Beard is complaining of Congestion, Cough, Wheezing, Headache, and Fever        HPI:   Cough  This is a new problem. The current episode started yesterday. The problem has been waxing and waning. The cough is Non-productive. Associated symptoms include headaches and wheezing. Pertinent negatives include no chest pain, chills, ear pain, fever, myalgias, rash, rhinorrhea, sore throat or shortness of breath. There is no history of asthma.   Wheezing   Associated symptoms include coughing and headaches. Pertinent negatives include no abdominal pain, chest pain, chills, diarrhea, ear pain, fever, rash, rhinorrhea, shortness of breath, sore throat or vomiting. There is no history of asthma.   Headache  Fever   This is a new problem. The current episode started yesterday. Associated symptoms include coughing, headaches and wheezing. Pertinent negatives include no abdominal pain, chest pain, congestion, diarrhea, ear pain, nausea, rash, sore throat or vomiting.       Past Medical History:   Diagnosis Date    Hypertension     Sinus tachycardia        Past Surgical History:   Procedure Laterality Date     SECTION  [id number]    X's 2       Family History   Problem Relation Age of Onset    High Blood Pressure Mother     Other Father         Cardiovascualr Disease    High Blood Pressure Father        Social History     Tobacco Use    Smoking status: Never    Smokeless tobacco: Never   Substance Use Topics    Alcohol use: No        Current Outpatient Medications   Medication Sig Dispense Refill    predniSONE (DELTASONE) 10 MG tablet Take 1 tablet by mouth 2 times daily for 5 days 10 tablet 0

## 2024-01-17 NOTE — PATIENT INSTRUCTIONS
Recommended supportive care:  -Decadron 10mg IM given.  -Can start prednisone tomorrow if wheezing not improved  - Increase fluid intake  - Encouraged adequate rest  - Recommended OTC claritin or zyrtec and flonase   - The patient is to follow up with PCP or return to clinic if symptoms worsen/fail to improve.

## 2024-01-17 NOTE — PROGRESS NOTES
Medication was administered by Melita Torres MA at 10:54 AM.    Medication: dexamethasone  Amount: 10mg  Route: intramuscular  Site: Dorsogluteal right    Patient tolerated well.

## 2024-01-18 ENCOUNTER — NURSE TRIAGE (OUTPATIENT)
Dept: CALL CENTER | Facility: HOSPITAL | Age: 34
End: 2024-01-18
Payer: COMMERCIAL

## 2024-01-19 NOTE — TELEPHONE ENCOUNTER
Reason for Disposition   MILD asthma attack (e.g., no SOB at rest, mild SOB with walking, speaks normally in sentences, mild wheezing)    Additional Information   Negative: SEVERE asthma attack (e.g., struggling for each breath, speaks in single words, loud wheezes, pulse >120)  (RED  Zone)   Negative: Bluish (or gray) lips or face now   Negative: Difficult to awaken or acting confused (e.g., disoriented, slurred speech)   Negative: Passed out (i.e., lost consciousness, collapsed and was not responding)   Negative: Wheezing started suddenly after medicine, an allergic food, or bee sting   Negative: Sounds like a life-threatening emergency to the triager   Negative: [1] MODERATE asthma attack (e.g., SOB at rest, speaks in phrases, audible wheezes) AND [2] doesn't have neb or inhaler available   Negative: Peak flow rate less than 50% of baseline level  (RED  Zone)   Negative: Oxygen level (e.g., pulse oximetry) 90 percent or lower   Negative: [1] Hospitalized before with asthma AND [2] feels the same now   Negative: Chest pain  (Exception: Mild chest tightness that feels the same as prior asthma attacks.)   Negative: [1] MODERATE asthma attack (e.g., SOB at rest, speaks in phrases, audible wheezes) AND [2] not resolved after 2 or 3 inhaler or nebulizer treatments given 20 minutes apart   Negative: [1] Peak flow rate 50-79% of baseline level (YELLOW zone) AND [2] still present after 2 or 3 inhaler or nebulizer treatments given 20 minutes apart   Negative: Quick-relief asthma medicine (e.g., albuterol /salbutamol, levalbuterol by inhaler or nebulizer) is needed more frequently than every 4 hours to keep you comfortable   Negative: Fever > 103 F (39.4 C)   Negative: Patient sounds very sick or weak to the triager   Negative: [1] Continuous (nonstop) coughing AND [2] keeps from working or sleeping AND [3] not improved after 2 or 3 inhaler or nebulizer treatments given 20 minutes apart   Negative: [1] Fever > 101 F (38.3  "C) AND [2] age > 60 years   Negative: [1] Wheezing or coughing AND [2] hasn't used neb or inhaler (up to 3 treatments given 20 minutes apart) AND [3] it's available   Negative: [1] COVID-19 diagnosed or suspected (e.g., COVID-19 present in community, known COVID-19 exposure, positive test) AND [2] COVID symptoms (e.g., cough, fever)   Negative: [1] Influenza diagnosed or suspected (e.g., flu present in the community, known flu exposure) AND [2] FLU symptoms (e.g., cough with fever)   Negative: Oxygen level (e.g., pulse oximetry) 91 to 94 percent   Negative: [1] MILD asthma attack (e.g., no SOB at rest, mild SOB with walking, speaks normally in sentences, mild wheezing) AND [2] lasting > 24 hours on prescribed treatment   Negative: Fever present > 3 days (72 hours)   Negative: Coughing up alexei-colored or yellow-green sputum   Negative: [1] Nasal discharge AND [2] present > 10 days   Negative: High risk adult asthmatic (i.e., prior intubation for asthma, hospitalized this past year for asthma, frequent steroid treatment)   Negative: [1] Mild wheezing comes and goes AND [2] present > 3 days   Negative: Missing more than 1 day of work or school per month for asthma   Negative: Asthma limits exercise or sports   Negative: Asthma attacks frequently awaken from sleep (e.g., > 2 times / month)   Negative: Uses more than 1 quick-relief inhaler (e.g., albuterol, salbutamol) per month   Negative: No asthma check-up in > 6 months    Answer Assessment - Initial Assessment Questions  1. RESPIRATORY STATUS: \"Describe your breathing?\" (e.g., wheezing, shortness of breath, unable to speak, severe coughing)       Some wheezing and shortness of breath  2. ONSET: \"When did this asthma attack begin?\"       Yesterday morning  3. TRIGGER: \"What do you think triggered this attack?\" (e.g., URI, exposure to pollen or other allergen, tobacco smoke)       I used some self mik that has avacado oil in it that I am allergic to  4. PEAK " "EXPIRATORY FLOW RATE (PEFR): \"Do you use a peak flow meter?\" If Yes, ask: \"What's the current peak flow? What's your personal best peak flow?\"       No  5. SEVERITY: \"How bad is this attack?\"     - MILD: No SOB at rest, mild SOB with walking, speaks normally in sentences, can lie down, no retractions, pulse < 100. (GREEN Zone: PEFR %)    - MODERATE: SOB at rest, SOB with minimal exertion and prefers to sit, cannot lie down flat, speaks in phrases, mild retractions, audible wheezing, pulse 100-120. (YELLOW Zone: PEFR 50-79%)     - SEVERE: Struggling for each breath, speaks in single words, struggling to breathe, sitting hunched forward, retractions, usually loud wheezing, sometimes minimal wheezing because of decreased air movement, pulse > 120. (RED Zone: PEFR < 50%).       Moderate  6. ASTHMA MEDICINES:  \"What treatments have you tried?\"     - INHALED QUICK RELIEF (RESCUE): \"What is your inhaled quick-relief medicine?\" (e.g., albuterol, salbutamol) \"Do you use an inhaler or a nebulizer?\" \"How frequently have you been using this medicine?\"    - CONTROLLER (LONG-TERM-CONTROL): \"Do you take an inhaled steroid? (e.g., Asmanex, Flovent, Pulmicort, Qvar)      No  7. INHALED QUICK-RELIEF TREATMENTS FOR THIS ATTACK: \"What treatments have you given yourself so far?\" and \"How many and how often?\" If using an inhaler, ask, \"How many puffs?\" Note: Routine treatments are 2 puffs every 4 hours as needed. Rescue treatments are 4 puffs repeated every 20 minutes, up to three times as needed.       xopenx  8. OTHER SYMPTOMS: \"Do you have any other symptoms? (e.g., chest pain, coughing up yellow sputum, fever, runny nose)      No  9. O2 SATURATION MONITOR:  \"Do you use an oxygen saturation monitor (pulse oximeter) at home?\" If Yes, \"What is your reading (oxygen level) today?\" \"What is your usual oxygen saturation reading?\" (e.g., 95%)      95  10. PREGNANCY: \"Is there any chance you are pregnant?\" \"When was your last menstrual " "period?\"        No    Protocols used: Asthma Attack-ADULT-AH    "

## 2024-02-02 ENCOUNTER — TELEPHONE (OUTPATIENT)
Dept: CARDIOLOGY | Facility: CLINIC | Age: 34
End: 2024-02-02
Payer: COMMERCIAL

## 2024-02-02 NOTE — TELEPHONE ENCOUNTER
Pt is going to call Monday with readings of bp and stated the asthma attacks were over the last 2 week when [place] was [place] but not currently.

## 2024-02-02 NOTE — TELEPHONE ENCOUNTER
Pt stated her asthma is the worse that is has ever been and was told to keep an eye one symptoms while taking metoprolol. Pt would like to know what she should do.

## 2024-03-04 ENCOUNTER — TELEPHONE (OUTPATIENT)
Dept: CARDIOLOGY | Facility: CLINIC | Age: 34
End: 2024-03-04

## 2024-03-04 DIAGNOSIS — I47.11 INAPPROPRIATE SINUS TACHYCARDIA: Primary | ICD-10-CM

## 2024-03-04 DIAGNOSIS — I10 PRIMARY HYPERTENSION: ICD-10-CM

## 2024-03-04 NOTE — TELEPHONE ENCOUNTER
Caller: Kaley Huff    Relationship: Self    Best call back number: [phone]    What is the best time to reach you: ANYTIME    Who are you requesting to speak with (clinical staff, provider,  specific staff member):          What was the call regarding: PATIENT NEEDS A REFERRAL SENT -[phone] FOR A DR. KOMAL COLE    Is it okay if the provider responds through [employer]: YES

## 2024-03-04 NOTE — TELEPHONE ENCOUNTER
Patient has [employer]. Requesting referral to Dr Jace Becker at [facility]. I have also advised for her to contact PCP for referral.Patient will be switching insurance in May in hoping to transition back to [facility]

## 2024-03-05 ENCOUNTER — HOSPITAL ENCOUNTER (OUTPATIENT)
Dept: NON INVASIVE DIAGNOSTICS | Age: 34
Discharge: HOME | End: 2024-03-05
Payer: COMMERCIAL

## 2024-03-05 ENCOUNTER — HOSPITAL ENCOUNTER (OUTPATIENT)
Dept: INFUSION THERAPY | Age: 34
Setting detail: INFUSION SERIES
Discharge: HOME | End: 2024-03-05
Payer: COMMERCIAL

## 2024-03-05 VITALS
TEMPERATURE: 98.2 F | SYSTOLIC BLOOD PRESSURE: 126 MMHG | RESPIRATION RATE: 18 BRPM | DIASTOLIC BLOOD PRESSURE: 84 MMHG | OXYGEN SATURATION: 98 % | HEART RATE: 82 BPM

## 2024-03-05 DIAGNOSIS — G90.A POTS (POSTURAL ORTHOSTATIC TACHYCARDIA SYNDROME): Primary | ICD-10-CM

## 2024-03-05 DIAGNOSIS — R00.2 PALPITATIONS: Primary | ICD-10-CM

## 2024-03-05 LAB
ALBUMIN SERPL-MCNC: 4.6 G/DL (ref 3.5–5.2)
ALP SERPL-CCNC: 9 U/L (ref 35–104)
ALT SERPL-CCNC: 22 U/L (ref 5–33)
ANION GAP SERPL CALCULATED.3IONS-SCNC: 13 MMOL/L (ref 7–19)
AST SERPL-CCNC: 19 U/L (ref 5–32)
BASOPHILS # BLD: 0 K/UL (ref 0–0.2)
BASOPHILS NFR BLD: 0.3 % (ref 0–1)
BILIRUB SERPL-MCNC: 0.4 MG/DL (ref 0.2–1.2)
BUN SERPL-MCNC: 11 MG/DL (ref 6–20)
CALCIUM SERPL-MCNC: 9.6 MG/DL (ref 8.6–10)
CHLORIDE SERPL-SCNC: 101 MMOL/L (ref 98–111)
CO2 SERPL-SCNC: 26 MMOL/L (ref 22–29)
CREAT SERPL-MCNC: 0.6 MG/DL (ref 0.5–0.9)
EOSINOPHIL # BLD: 0.1 K/UL (ref 0–0.6)
EOSINOPHIL NFR BLD: 1.4 % (ref 0–5)
ERYTHROCYTE [DISTWIDTH] IN BLOOD BY AUTOMATED COUNT: 12.1 % (ref 11.5–14.5)
GLUCOSE SERPL-MCNC: 107 MG/DL (ref 74–109)
HCT VFR BLD AUTO: 39.2 % (ref 37–47)
HGB BLD-MCNC: 13 G/DL (ref 12–16)
IMM GRANULOCYTES # BLD: 0 K/UL
LYMPHOCYTES # BLD: 1.4 K/UL (ref 1.1–4.5)
LYMPHOCYTES NFR BLD: 22.3 % (ref 20–40)
MAGNESIUM SERPL-MCNC: 2.1 MG/DL (ref 1.6–2.6)
MCH RBC QN AUTO: 30.7 PG (ref 27–31)
MCHC RBC AUTO-ENTMCNC: 33.2 G/DL (ref 33–37)
MCV RBC AUTO: 92.7 FL (ref 81–99)
MONOCYTES # BLD: 0.2 K/UL (ref 0–0.9)
MONOCYTES NFR BLD: 3.8 % (ref 0–10)
NEUTROPHILS # BLD: 4.5 K/UL (ref 1.5–7.5)
NEUTS SEG NFR BLD: 71.9 % (ref 50–65)
PHOSPHATE SERPL-MCNC: 4.6 MG/DL (ref 2.5–4.5)
PLATELET # BLD AUTO: 243 K/UL (ref 130–400)
PMV BLD AUTO: 12.8 FL (ref 9.4–12.3)
POTASSIUM SERPL-SCNC: 3.9 MMOL/L (ref 3.5–5)
PROT SERPL-MCNC: 7.7 G/DL (ref 6.6–8.7)
RBC # BLD AUTO: 4.23 M/UL (ref 4.2–5.4)
SODIUM SERPL-SCNC: 140 MMOL/L (ref 136–145)
TSH SERPL DL<=0.005 MIU/L-ACNC: 1.24 UIU/ML (ref 0.27–4.2)
WBC # BLD AUTO: 6.3 K/UL (ref 4.8–10.8)

## 2024-03-05 PROCEDURE — 96360 HYDRATION IV INFUSION INIT: CPT

## 2024-03-05 PROCEDURE — 2580000003 HC RX 258: Performed by: PHYSICIAN ASSISTANT

## 2024-03-05 PROCEDURE — 93005 ELECTROCARDIOGRAM TRACING: CPT | Performed by: PHYSICIAN ASSISTANT

## 2024-03-05 RX ORDER — HEPARIN 100 UNIT/ML
500 SYRINGE INTRAVENOUS PRN
OUTPATIENT
Start: 2024-03-05

## 2024-03-05 RX ORDER — 0.9 % SODIUM CHLORIDE 0.9 %
1000 INTRAVENOUS SOLUTION INTRAVENOUS ONCE
Status: COMPLETED | OUTPATIENT
Start: 2024-03-05 | End: 2024-03-05

## 2024-03-05 RX ORDER — SODIUM CHLORIDE 0.9 % (FLUSH) 0.9 %
5-40 SYRINGE (ML) INJECTION PRN
OUTPATIENT
Start: 2024-03-05

## 2024-03-05 RX ORDER — 0.9 % SODIUM CHLORIDE 0.9 %
1000 INTRAVENOUS SOLUTION INTRAVENOUS ONCE
Status: CANCELLED | OUTPATIENT
Start: 2024-03-05 | End: 2024-03-05

## 2024-03-05 RX ORDER — 0.9 % SODIUM CHLORIDE 0.9 %
1000 INTRAVENOUS SOLUTION INTRAVENOUS ONCE
OUTPATIENT
Start: 2024-03-05 | End: 2024-03-05

## 2024-03-05 RX ADMIN — SODIUM CHLORIDE 1000 ML: 9 INJECTION, SOLUTION INTRAVENOUS at 16:41

## 2024-03-05 NOTE — DISCHARGE INSTRUCTIONS
Learning About Postural Orthostatic Tachycardia Syndrome (POTS)  What is POTS?     Postural orthostatic tachycardia syndrome (POTS) is a fast heart rate (tachycardia) that starts after you stand up. This can cause symptoms such as dizziness or weakness.  What happens when you have POTS?  With POTS, the body does not control the heart rate as it should after you stand up. The change in heart rate happens within 10 minutes of standing up. This leads to symptoms such as dizziness, palpitations, trembling, or weakness. It's not known exactly why symptoms happen.  People with severe symptoms may find it hard to keep up with daily living. But treatment can help.  What are the symptoms?  Soon after you stand up, you may have symptoms such as:  A fast, pounding heartbeat (palpitations).  Trembling, dizziness, weakness, or lightheadedness.  Feeling faint or very tired.  With POTS, you may also have problems with:  Blurred vision, headaches, nausea, and diarrhea.  Trouble sleeping and feeling anxious.  Keeping your attention focused.  Symptoms can range from mild to severe.  Some things can make symptoms worse. These include heat, menstrual cycle, dehydration, alcohol, exercise, and standing for a long time.  When you first notice symptoms, lying down may help you feel better.  What causes it?  Experts don't understand what causes it, but different body systems seem to be out of balance. POTS may follow certain triggers such as a viral illness, a surgery, or pregnancy.  How is POTS diagnosed?  To learn what is causing your symptoms, your doctor may:  Ask about your symptoms, including when and how they started.  Check how your blood pressure and heart rate change when you move from lying down to sitting to standing.  Do blood tests.  Check your heart with an electrocardiogram (EKG).  You may have other tests such as a tilt table test. This test checks how your body responds when you change positions.  How is POTS  treated?  Work with your doctor to find the right mix of treatments to help relieve symptoms and improve your quality of life. Treatment may include taking medicine prescribed by your doctor. For some people, taking medicine that affects blood pressure can help. Taking medicine that keeps the body's fluids balanced may also help.  When should you call for help?  Call 911 anytime you think you may need emergency care. For example, call if:  You passed out (lost consciousness), and it feels different than your typical episode or you don't recover as quickly as you have in the past.  Call your doctor now or seek immediate medical care if:  Your symptoms are getting worse. For example, you are more dizzy or lightheaded.  Watch closely for changes in your health, and be sure to contact your doctor if:  You do not get better as expected.  Follow-up care is a key part of your treatment and safety. Be sure to make and go to all appointments, and call your doctor if you are having problems. It's also a good idea to know your test results and keep a list of the medicines you take.  Where can you learn more?  Go to [email or web address]://[email or web address].net/patientEd and enter Q913 to learn more about \"Learning About Postural Orthostatic Tachycardia Syndrome (POTS).\"  Current as of: June 25, 2023               Content Version: 13.9  © 3051-9243 [employer].   Care instructions adapted under license by [employer]. If you have questions about a medical condition or this instruction, always ask your healthcare professional. [employer] disclaims any warranty or liability for your use of this information.

## 2024-03-06 ENCOUNTER — CLINICAL DOCUMENTATION (OUTPATIENT)
Facility: HOSPITAL | Age: 34
End: 2024-03-06

## 2024-03-06 LAB
EKG P AXIS: 53 DEGREES
EKG P-R INTERVAL: 130 MS
EKG Q-T INTERVAL: 350 MS
EKG QRS DURATION: 84 MS
EKG QTC CALCULATION (BAZETT): 400 MS
EKG T AXIS: 33 DEGREES

## 2024-03-07 NOTE — PROGRESS NOTES
Patient Assistance                   Additional notes: Reviewed chart and no assistance is available for treatment.

## 2024-03-08 LAB — ZINC SERPL-MCNC: 84.4 UG/DL (ref 60–120)

## 2024-03-21 ENCOUNTER — OFFICE VISIT (OUTPATIENT)
Dept: CARDIOLOGY CLINIC | Age: 34
End: 2024-03-21
Payer: COMMERCIAL

## 2024-03-21 VITALS
HEART RATE: 85 BPM | HEIGHT: 64 IN | BODY MASS INDEX: 30.39 KG/M2 | DIASTOLIC BLOOD PRESSURE: 80 MMHG | SYSTOLIC BLOOD PRESSURE: 128 MMHG | WEIGHT: 178 LBS

## 2024-03-21 DIAGNOSIS — Z82.49 FAMILY HISTORY OF CORONARY ARTERY DISEASE: ICD-10-CM

## 2024-03-21 DIAGNOSIS — R00.0 TACHYCARDIA: Primary | ICD-10-CM

## 2024-03-21 DIAGNOSIS — I10 PRIMARY HYPERTENSION: ICD-10-CM

## 2024-03-21 PROCEDURE — 3079F DIAST BP 80-89 MM HG: CPT | Performed by: CLINICAL NURSE SPECIALIST

## 2024-03-21 PROCEDURE — 3074F SYST BP LT 130 MM HG: CPT | Performed by: CLINICAL NURSE SPECIALIST

## 2024-03-21 PROCEDURE — 93000 ELECTROCARDIOGRAM COMPLETE: CPT | Performed by: CLINICAL NURSE SPECIALIST

## 2024-03-21 PROCEDURE — 99203 OFFICE O/P NEW LOW 30 MIN: CPT | Performed by: CLINICAL NURSE SPECIALIST

## 2024-03-21 RX ORDER — METOPROLOL SUCCINATE 50 MG/1
75 TABLET, EXTENDED RELEASE ORAL DAILY
Qty: 135 TABLET | Refills: 3 | Status: SHIPPED | OUTPATIENT
Start: 2024-03-21

## 2024-03-21 NOTE — PROGRESS NOTES
severe anxiety or insomnia.  No confusion.   All other review of systems are negative.      Objective  Vital Signs - /80   Ht 1.626 m (5' 4\")   Wt 80.7 kg (178 lb)   BMI 30.55 kg/m²   General - Guillermina is alert, cooperative, and pleasant.  Well groomed.  No acute distress.    Body habitus is overweight.  HEENT - The head is normocephalic. No circumoral cyanosis.  Dentition is normal.   Ears and nose externally normal. No abnormal scars or lesions noted  EYES -  No Xanthelasma, no arcus senilis, no conjunctival hemorrhages or discharge.   Neck - Supple, without increased jugular venous pressures.  No carotid bruits.  No mass.   Respiratory - Lungs are clear bilaterally.  No wheezes or rales.  Normal effort without use of accessory muscles. No tactile fremitus on palpation  Cardiovascular - Heart has regular rhythm and rate.  No murmurs, rubs or gallops.    + pedal pulses and no varicosities.      Abdominal -  Soft, nontender, nondistended.  Bowel sounds are intact.   Extremities - No clubbing, cyanosis, or  edema.   Musculoskeletal - No musculoskeletal symptoms.  No clubbing .  No Osler's nodes.   Gait normal .  No kyphosis or scoliosis.   Skin -  no statis ulcers or dermatitis.  Neurological - No focal signs are identified.  Oriented to person, place and time.    Psychiatric -  Appropriate affect and mood.           Assessment:          Diagnosis Orders   1. Tachycardia  EKG 12 lead      2. Primary hypertension        3. Family history of coronary artery disease          Inappropriate sinus tachycardia  EKG today shows normal sinus rhythm with rate of 85.  Normal EKG  Reviewed previous EKGs and workup  Echo reviewed under care everywhere.  No significant valvular disease with normal systolic and diastolic function    Rate well-controlled on Toprol 50 mg daily upped to 75 recently due to increase in palpitations.      Discussed controlling tachycardia and palpitations.  Palpitations may have been

## 2024-03-21 NOTE — PATIENT INSTRUCTIONS
Continue the Toprol 75mg daily   Let us know if having worsening palpitations or fast heart rates.    Maintain good blood pressure control-goal<130/80 at rest  Maintain good cholesterol control LDL goal<70 with arterial disease  If you are diabetic work to keep/obtain hemoglobin A1c< 7    Follow up in 6 mos  With Dr. Carbajal   Call with any questions or concerns  Follow up with PCP for non cardiac problems  Report any new problems  Cardiovascular Fitness-Exercise as tolerated.  Strive for 30 minutes of exercise most days of the week.    Cardiac / Healthy Diet  Continue current medications as directed  Continue plan of treatment

## 2024-03-28 ENCOUNTER — NURSE TRIAGE (OUTPATIENT)
Dept: CALL CENTER | Facility: HOSPITAL | Age: 34
End: 2024-03-28
Payer: COMMERCIAL

## 2024-03-28 NOTE — TELEPHONE ENCOUNTER
Caller states she took Zyrtec around 10a.m. and Claritin around 12noon and is questioning if she can take Benadryl now prophylactically since she came in brief contact with same face cream that made her face swell earlier in week and treated by Dr. Robison.   Caller states she understood PCP told her she could take all 3 but wanting to verify.     Call placed to backline number and spoke to staff member who read OV note. Caller was to take Pepcid 2 times per day, Zyrtec one time per day and Benadryl as needed.    Reviewed this with caller. Denies  itching of face, very mild redness. Denies swelling. Encouraged caller to apply ice to the mildly reddened area and to call back if notes any swelling. To hold Benadryl for now as she has taken Claritin and Zyrtec and to call back if notes any swelling. Caller verbalized understanding.    Reason for Disposition   [1] Caller has URGENT medicine question about med that PCP or specialist prescribed AND [2] triager unable to answer question    Additional Information   Negative: [1] Intentional drug overdose AND [2] suicidal thoughts or ideas   Negative: Drug overdose and triager unable to answer question   Negative: Caller requesting a renewal or refill of a medicine patient is currently taking   Negative: Caller requesting information unrelated to medicine   Negative: Caller requesting information about COVID-19 Vaccine   Negative: Caller requesting information about Emergency Contraception   Negative: Caller requesting information about Combined Birth Control Pills   Negative: Caller requesting information about Progestin Birth Control Pills   Negative: Caller requesting information about Post-Op pain or medicines   Negative: Caller requesting a prescription antibiotic (such as Penicillin) for Strep throat and has a positive culture result   Negative: Caller requesting a prescription anti-viral med (such as Tamiflu) and has influenza (flu) symptoms   Negative: Immunization  "reaction suspected   Negative: Rash while taking a medicine or within 3 days of stopping it   Negative: [1] Asthma and [2] having symptoms of asthma (cough, wheezing, etc.)   Negative: [1] Symptom of illness (e.g., headache, abdominal pain, earache, vomiting) AND [2] more than mild   Negative: Breastfeeding questions about mother's medicines and diet   Negative: MORE THAN A DOUBLE DOSE of a prescription or over-the-counter (OTC) drug   Negative: [1] DOUBLE DOSE (an extra dose or lesser amount) of prescription drug AND [2] any symptoms (e.g., dizziness, nausea, pain, sleepiness)   Negative: [1] DOUBLE DOSE (an extra dose or lesser amount) of over-the-counter (OTC) drug AND [2] any symptoms (e.g., dizziness, nausea, pain, sleepiness)   Negative: Took another person's prescription drug   Negative: [1] DOUBLE DOSE (an extra dose or lesser amount) of prescription drug AND [2] NO symptoms  (Exception: A double dose of antibiotics.)   Negative: Diabetes drug error or overdose (e.g., took wrong type of insulin or took extra dose)   Negative: [1] Prescription not at pharmacy AND [2] was prescribed by PCP recently (Exception: Triager has access to EMR and prescription is recorded there. Go to Home Care and confirm for pharmacy.)   Negative: [1] Pharmacy calling with prescription question AND [2] triager unable to answer question    Answer Assessment - Initial Assessment Questions  1. NAME of MEDICINE: \"What medicine(s) are you calling about?\"      Benadryl  2. QUESTION: \"What is your question?\" (e.g., double dose of medicine, side effect)      Questioning whether she can take Benadryl in addition to Zyrtec and Claritin  3. PRESCRIBER: \"Who prescribed the medicine?\" Reason: if prescribed by specialist, call should be referred to that group.      Dr. Vaca  4. SYMPTOMS: \"Do you have any symptoms?\" If Yes, ask: \"What symptoms are you having?\"  \"How bad are the symptoms (e.g., mild, moderate, severe)      Had an allergic " "reaction to face cream earlier in week and treated per Dr. Robison. Improved and then she put on robe which had some of face cream on it and is afraid whe will start to have reaction.States she thinks Dr. Robison told her she could take Benadryl in addition to zyrtec and claritin.  5. PREGNANCY:  \"Is there any chance that you are pregnant?\" \"When was your last menstrual period?\"      Not asked    Protocols used: Medication Question Call-ADULT-    "

## 2024-05-06 RX ORDER — METOPROLOL SUCCINATE 50 MG/1
75 TABLET, EXTENDED RELEASE ORAL DAILY
Qty: 135 TABLET | Refills: 3 | Status: SHIPPED | OUTPATIENT
Start: 2024-05-06

## 2024-08-05 ENCOUNTER — OFFICE VISIT (OUTPATIENT)
Dept: CARDIOLOGY | Facility: CLINIC | Age: 34
End: 2024-08-05
Payer: COMMERCIAL

## 2024-08-05 VITALS
OXYGEN SATURATION: 99 % | WEIGHT: 163 LBS | SYSTOLIC BLOOD PRESSURE: 118 MMHG | HEART RATE: 77 BPM | BODY MASS INDEX: 27.83 KG/M2 | HEIGHT: 64 IN | DIASTOLIC BLOOD PRESSURE: 78 MMHG | RESPIRATION RATE: 18 BRPM

## 2024-08-05 DIAGNOSIS — Z82.49 FAMILY HISTORY OF PREMATURE CAD: ICD-10-CM

## 2024-08-05 DIAGNOSIS — E78.5 HYPERLIPIDEMIA, UNSPECIFIED HYPERLIPIDEMIA TYPE: ICD-10-CM

## 2024-08-05 DIAGNOSIS — I10 PRIMARY HYPERTENSION: ICD-10-CM

## 2024-08-05 DIAGNOSIS — I47.11 INAPPROPRIATE SINUS TACHYCARDIA: Primary | ICD-10-CM

## 2024-08-05 PROCEDURE — 99214 OFFICE O/P EST MOD 30 MIN: CPT | Performed by: HOSPITALIST

## 2024-08-05 PROCEDURE — 93000 ELECTROCARDIOGRAM COMPLETE: CPT | Performed by: HOSPITALIST

## 2024-08-05 RX ORDER — EPINEPHRINE 0.3 MG/.3ML
0.3 INJECTION SUBCUTANEOUS
COMMUNITY
Start: 2024-08-02

## 2024-08-05 RX ORDER — PROGESTERONE 100 MG/1
150 CAPSULE ORAL DAILY
COMMUNITY

## 2024-08-05 RX ORDER — ALBUTEROL SULFATE AND BUDESONIDE 90; 80 UG/1; UG/1
AEROSOL, METERED RESPIRATORY (INHALATION)
COMMUNITY

## 2024-08-05 NOTE — PROGRESS NOTES
Reason For Visit:  Hypertension, hyperlipidemia, inappropriate sinus tachycardia    Subjective        Kaley Huff is a 34 y.o. female with the below pertinent PMH who presents for follow-up of the above issue.    Kaley Huff was most recently seen by me in clinic 10/25/2023 at which time she was overall doing well.  She was going to try to take metoprolol in the morning as she had noticed a slight increase in HR and BP during the day.  She was started on atorvastatin given hyperlipidemia.    Since her last visit, the patient reports that she has done reasonably well.  She did have a brief period in March when she had increased palpitations for about a week in the context of increased stress.  She was told this was due to PVCs presumably from an ECG but did not wear a monitor.  Her metoprolol was increased to 75 mg daily and her symptoms eventually resolved; metoprolol was decreased back to 50 mg daily and she has remained stable without significant palpitations recently.  She reports good activity tolerance and denies any abnormal shortness of breath or exertional chest pain.  She also denies significant lightheadedness, orthopnea, or peripheral edema.    Of note, she took atorvastatin only for about 4 days and then stopped taking it because she had labs checked at another facility that showed quite normal lipids.  She subsequently has had her lipids checked 2 more times with LDL in the 140s-150s, but she decided to hold off on taking the atorvastatin until coming back today.    ROS: Pertinent findings are included above.    Cardiac Studies  Cardiac monitor 1/2023: Average HR 90 bpm (range  bpm).  No significant arrhythmias or frequent ectopy noted.  Symptoms showed no correlation to abnormal findings.  Tilt table test 1/25/2023: Resting  bpm prior to testing.  With initial tilt phase, BP increased as did HR.  With sublingual nitroglycerin provocation, BP decreased appropriately and HR  increased appropriately until minute 5 when it dropped from 157 bpm down to 136 bpm and patient became very symptomatic including dizziness, lightheadedness and nauseated.  Appropriate HR and BP response to the recovery phase.  Findings are consistent with possible vasovagal response.  Echo 2/6/2023: LVEF 56-60%, normal RV size/function, no significant valve disease.     Pertinent PMH  Inappropriate sinus tachycardia  Hypertension  Hyperlipidemia  Asthma  Family history of premature CAD (father had CAD in his early 40s as well as a thoracic aortic aneurysm)    Pertinent past medical, surgical, family, and social history were reviewed.      Current Outpatient Medications:     Anucort-HC 25 MG suppository, , Disp: , Rfl:     cetirizine (zyrTEC) 10 MG tablet, Take 1 tablet by mouth 2 (Two) Times a Day., Disp: , Rfl:     EPINEPHrine (EPIPEN) 0.3 MG/0.3ML solution auto-injector injection, 0.3 mL., Disp: , Rfl:     Hydrocortisone, Perianal, (Anusol-HC) 2.5 % rectal cream, Insert  into the rectum 2 (Two) Times a Day. Apply rectally 2 times daily, Disp: 30 g, Rfl: 1    Loratadine 10 MG capsule, Take 1 capsule by mouth Daily., Disp: , Rfl:     melatonin 5 MG tablet tablet, Take 12 mg by mouth Every Night., Disp: , Rfl:     metoprolol succinate XL (TOPROL-XL) 50 MG 24 hr tablet, Take 1 tablet by mouth Daily., Disp: 90 tablet, Rfl: 3    montelukast (SINGULAIR) 10 MG tablet, , Disp: , Rfl:     Triamcinolone Acetonide (NASACORT) 55 MCG/ACT nasal inhaler, 2 sprays into the nostril(s) as directed by provider 1 (One) Time As Needed., Disp: , Rfl:     Albuterol-Budesonide (Airsupra) 90-80 MCG/ACT aerosol, Inhale., Disp: , Rfl:     atorvastatin (LIPITOR) 10 MG tablet, Take 1 tablet by mouth Daily. (Patient not taking: Reported on 8/5/2024), Disp: 90 tablet, Rfl: 3    levalbuterol (XOPENEX HFA) 45 MCG/ACT inhaler, Inhale 1-2 puffs Every 4 (Four) Hours As Needed for Wheezing. (Patient not taking: Reported on 8/5/2024), Disp: , Rfl:      "Progesterone (PROMETRIUM) 100 MG capsule, Take 150 mg by mouth Daily., Disp: , Rfl:     saccharomyces boulardii (FLORASTOR) 250 MG capsule, Take 1 capsule by mouth 2 (Two) Times a Day. (Patient not taking: Reported on 8/5/2024), Disp: , Rfl:     Tirzepatide (MOUNJARO) 2.5 MG/0.5ML solution pen-injector pen, Inject 0.5 mL under the skin into the appropriate area as directed 1 (One) Time Per Week., Disp: , Rfl:      Objective   Vital Signs:  /78   Pulse 77   Resp 18   Ht 162.6 cm (64\")   Wt 73.9 kg (163 lb)   SpO2 99%   BMI 27.98 kg/m²   Estimated body mass index is 27.98 kg/m² as calculated from the following:    Height as of this encounter: 162.6 cm (64\").    Weight as of this encounter: 73.9 kg (163 lb).      Constitutional:       Appearance: Healthy appearance. Not in distress.   Neck:      Vascular: JVD normal.   Pulmonary:      Effort: Pulmonary effort is normal.      Breath sounds: Normal breath sounds.   Cardiovascular:      Normal rate. Regular rhythm.      Murmurs: There is no murmur.      No gallop.  No click. No rub.   Edema:     Peripheral edema absent.   Abdominal:      General: There is no distension.      Palpations: Abdomen is soft.      Tenderness: There is no abdominal tenderness.   Skin:     General: Skin is warm and dry.   Neurological:      Mental Status: Alert and oriented to person, place and time.        Result Review :             ECG 12 Lead    Date/Time: 8/5/2024 3:28 PM  Performed by: Gonzalo Hopkins MD    Authorized by: Gonzalo Hopkins MD  Comparison: compared with previous ECG from 10/24/2023  Similar to previous ECG  Rhythm: sinus rhythm  Rate: normal  BPM: 77  Conduction: conduction normal  QRS axis: normal    Clinical impression: normal ECG            Assessment and Plan   Diagnoses and all orders for this visit:    1. Inappropriate sinus tachycardia (Primary)    2. Family history of premature CAD  -     CT Cardiac Calcium Score Without Dye; Future    3. Hyperlipidemia, " unspecified hyperlipidemia type  -     CT Cardiac Calcium Score Without Dye; Future    4. Primary hypertension  -     ECG 12 Lead      -Currently doing well and without significant symptoms.  BP well-controlled and no current palpitations.  - Continue Toprol-XL 50 mg daily  - We discussed options and agreed to have her proceed with a calcium score CT scan and if this is abnormal restart atorvastatin but if it does not show any significant coronary calcification focus on lifestyle modifications with plan to recheck lipids in the future.    Follow Up   Return in about 1 year (around 8/5/2025).  Patient was given instructions and counseling regarding her condition or for health maintenance advice. Please see specific information pulled into the AVS if appropriate.       EMR Dragon/Transcription disclaimer: Much of this encounter note is an electronic transcription/translation of spoken language to printed text. The electronic translation of spoken language may permit erroneous, or at times, nonsensical words or phrases to be inadvertently transcribed; although I have reviewed the note for such errors, some may still exist.

## 2024-08-12 ENCOUNTER — HOSPITAL ENCOUNTER (OUTPATIENT)
Dept: CT IMAGING | Facility: HOSPITAL | Age: 34
Discharge: HOME | End: 2024-08-12
Admitting: HOSPITALIST

## 2024-08-12 DIAGNOSIS — E78.5 HYPERLIPIDEMIA, UNSPECIFIED HYPERLIPIDEMIA TYPE: ICD-10-CM

## 2024-08-12 DIAGNOSIS — Z82.49 FAMILY HISTORY OF PREMATURE CAD: ICD-10-CM

## 2024-08-12 PROCEDURE — 75571 CT HRT W/O DYE W/CA TEST: CPT | Performed by: HOSPITALIST

## 2024-08-12 PROCEDURE — 75571 CT HRT W/O DYE W/CA TEST: CPT

## 2024-11-06 ENCOUNTER — OFFICE VISIT (OUTPATIENT)
Dept: SURGERY | Facility: CLINIC | Age: 34
End: 2024-11-06
Payer: COMMERCIAL

## 2024-11-06 ENCOUNTER — PATIENT ROUNDING (BHMG ONLY) (OUTPATIENT)
Dept: SURGERY | Facility: CLINIC | Age: 34
End: 2024-11-06
Payer: COMMERCIAL

## 2024-11-06 VITALS
HEIGHT: 64 IN | HEART RATE: 85 BPM | WEIGHT: 153 LBS | DIASTOLIC BLOOD PRESSURE: 85 MMHG | SYSTOLIC BLOOD PRESSURE: 133 MMHG | OXYGEN SATURATION: 97 % | BODY MASS INDEX: 26.12 KG/M2

## 2024-11-06 DIAGNOSIS — K64.8 INTERNAL HEMORRHOID: Primary | ICD-10-CM

## 2024-11-06 NOTE — PROGRESS NOTES
"Office New Patient History and Physical:     Referring Provider: Leonor Mancia PA    Chief Complaint   Patient presents with    Hemorrhoids       Subjective .     History of present illness:  Kaley Huff is a 34 y.o. female who presents to the clinic for evaluation of hemorrhoids. She states that she has had problems going back many years and symptoms have progressively gotten worse. She endorses that with bowel movements she at times will have to push tissue \"back up\". She states that the protruding tissue does cause pain. Most recently, she had surgery and states that with bowel movements she was unable to push the tissue back up. She denies blood with bowel movements or blood in her stool. She endorses trying multiple different conservative measures including preparation H, sitz baths, Rectal rockets, rectal creams and ointments and increasing fiber intake in her diet. She states that things have improved since she had the extremely painful episode and there is not anything protruding currently. However, she states that as it has gotten progressively worse, she is wanting to have something done about them.     BMI is 26.26. She is a nonsmoker. She does not take any blood thinners.     Review of Systems    Review of Systems - General ROS: negative  ENT ROS: negative  Respiratory ROS: no cough, shortness of breath, or wheezing  Cardiovascular ROS: no chest pain or dyspnea on exertion  Gastrointestinal ROS: positive for - hemorrhoids  Genito-Urinary ROS: no dysuria, trouble voiding, or hematuria  Dermatological ROS: negative   Breast ROS: negative for breast lumps  Hematological and Lymphatic ROS: negative  Musculoskeletal ROS: negative   Neurological ROS: no TIA or stroke symptoms    Psychological ROS: negative  Endocrine ROS: negative    History  Past Medical History:   Diagnosis Date    Anxiety     Asthma 1994    Eczema     Hypertension     Tachycardia    ,   Past Surgical History:   Procedure " Laterality Date     SECTION      X 2    ENDOSCOPY      SALPINGECTOMY Bilateral 2023    Procedure: SALPINGECTOMY LAPAROSCOPIC for sterilization;  Surgeon: Arturo Kennedy MD;  Location: [facility];  Service: Obstetrics/Gynecology;  Laterality: Bilateral;    TONSILLECTOMY      WISDOM TOOTH EXTRACTION     ,   Family History   Problem Relation Age of Onset    Anemia Mother     Asthma Mother     Hypertension Mother     Valvular heart disease Mother     Hypertension Father     Heart disease Father    ,   Social History     Tobacco Use    Smoking status: Never     Passive exposure: Never    Smokeless tobacco: Never   Vaping Use    Vaping status: Never Used   Substance Use Topics    Alcohol use: Yes     Comment: occasional    Drug use: Never   , (Not in a hospital admission)   and Allergies:  Amoxicillin, Bactrim [sulfamethoxazole-trimethoprim], Keflex [cephalexin], Penicillins, Zithromax [azithromycin], Iodine, and Erythromycin    Current Outpatient Medications:     Albuterol-Budesonide (Airsupra) 90-80 MCG/ACT aerosol, Inhale., Disp: , Rfl:     Anucort-HC 25 MG suppository, , Disp: , Rfl:     cetirizine (zyrTEC) 10 MG tablet, Take 1 tablet by mouth 2 (Two) Times a Day., Disp: , Rfl:     EPINEPHrine (EPIPEN) 0.3 MG/0.3ML solution auto-injector injection, 0.3 mL., Disp: , Rfl:     Hydrocortisone, Perianal, (Anusol-HC) 2.5 % rectal cream, Insert  into the rectum 2 (Two) Times a Day. Apply rectally 2 times daily, Disp: 30 g, Rfl: 1    Loratadine 10 MG capsule, Take 1 capsule by mouth Daily., Disp: , Rfl:     melatonin 5 MG tablet tablet, Take 12 mg by mouth Every Night., Disp: , Rfl:     metoprolol succinate XL (TOPROL-XL) 50 MG 24 hr tablet, Take 1 tablet by mouth Daily., Disp: 90 tablet, Rfl: 3    montelukast (SINGULAIR) 10 MG tablet, , Disp: , Rfl:     Progesterone (PROMETRIUM) 100 MG capsule, Take 150 mg by mouth Daily., Disp: , Rfl:     Triamcinolone Acetonide (NASACORT) 55 MCG/ACT nasal inhaler, Administer  "2 sprays into the nostril(s) as directed by provider 1 (One) Time As Needed., Disp: , Rfl:     Objective     Vital Signs   /85   Pulse 85   Ht 162.6 cm (64\")   Wt 69.4 kg (153 lb)   SpO2 97%   BMI 26.26 kg/m²      Physical Exam:  General appearance - alert, well appearing, and in no distress  Mental status - normal mood, behavior, speech, dress, motor activity, and thought processes  Eyes - sclera anicteric  Neck - supple, no significant adenopathy  Chest - no tachypnea, retractions or cyanosis  Heart - normal rate and regular rhythm  Rectal - normal rectal, no masses  Neurological - alert, oriented, normal speech, no focal findings or movement disorder noted  Extremities - no pedal edema noted  Skin - normal coloration and turgor, no rashes, no suspicious skin lesions noted    Results Review:  Result Review :            Assessment & Plan       Diagnoses and all orders for this visit:    1. Internal hemorrhoid (Primary)     Ms. Huff is a 34 year old female who presents to the clinic for evaluation of hemorrhoids. Upon physical exam, hemorrhoids were not appreciated. However, given her clinical picture I believe that she could have internal hemorrhoids. Because of this, I have recommended returning to our clinic to see Dr. Engle for evaluation and discussion of possible hemorrhoid banding. She voices understanding and is agreeable to the plan.     Follow up:     BMI is >= 25 and <30. (Overweight) The following options were offered after discussion;: weight loss educational material (shared in after visit summary)    Return for make follow up appointment with Dr. Engle for hemorrhoid banding .        Melisa Aggarwal PA-C  11/07/24  13:29 CST            "

## 2024-11-13 NOTE — PROGRESS NOTES
[facility] History and Physical  Kaley Huff  MRN: [id number]  Age: 34 y.o. female   YOB: 1990    SUBJECTIVE  History of Presenting Illness   Patient is a 34 y.o. female presenting for hemorrhoid evaluation.  Pertinent past medical history includes anxiety, asthma, hypertension, recently underwent tummy tuck, breast augmentation; she describes symptoms as prolapse with some bleeding and minimal pain, and describes bowel movements as generally soft and usually spends less than 5 minutes on the commode with occasional straining.    Colonoscopy: Never  Ob/Gyn Hx: 2 children born via   CRC Hx: None-no first-degree relatives with colorectal cancer history  Anticoagulation/Antiplatelet Rx: None      Past Medical History     Past Medical History:  Past Medical History:   Diagnosis Date    Anxiety     Asthma     Eczema     Hypertension     Tachycardia        PCP: Julia Robison DO    Past Surgical History:  Past Surgical History:   Procedure Laterality Date     SECTION      X 2    ENDOSCOPY      SALPINGECTOMY Bilateral 2023    Procedure: SALPINGECTOMY LAPAROSCOPIC for sterilization;  Surgeon: Arturo Kennedy MD;  Location: [facility];  Service: Obstetrics/Gynecology;  Laterality: Bilateral;    TONSILLECTOMY      WISDOM TOOTH EXTRACTION         Family History:  Family History   Problem Relation Age of Onset    Anemia Mother     Asthma Mother     Hypertension Mother     Valvular heart disease Mother     Hypertension Father     Heart disease Father        Social History:  Social History     Tobacco Use    Smoking status: Never     Passive exposure: Never    Smokeless tobacco: Never   Substance Use Topics    Alcohol use: Yes     Comment: occasional       Allergies:  Allergies   Allergen Reactions    Amoxicillin Anaphylaxis    Bactrim [Sulfamethoxazole-Trimethoprim] Anaphylaxis    Keflex [Cephalexin] Anaphylaxis    Penicillins Anaphylaxis     "Zithromax [Azithromycin] Anaphylaxis    Iodine Hives     States she had \"a single hive\" after CT dye    Erythromycin Rash       Medications:  Current Outpatient Medications   Medication Instructions    Albuterol-Budesonide (Airsupra) 90-80 MCG/ACT aerosol Inhale.    Anucort-HC 25 MG suppository     cetirizine (zyrTEC) 10 MG tablet 1 tablet, 2 Times Daily    EPINEPHrine (EPIPEN) 0.3 mg    Hydrocortisone, Perianal, (Anusol-HC) 2.5 % rectal cream Rectal, 2 Times Daily, Apply rectally 2 times daily    Loratadine 10 MG capsule 1 capsule, Daily    melatonin 12 mg, Nightly    metoprolol succinate XL (TOPROL-XL) 50 mg, Oral, Daily    montelukast (SINGULAIR) 10 MG tablet     Progesterone (PROMETRIUM) 150 mg, Daily    Triamcinolone Acetonide (NASACORT) 55 MCG/ACT nasal inhaler 2 sprays, Once As Needed         Review of Systems   She otherwise denies lightheadedness, dizziness, fainting, passing out, headache, nausea, vomiting, chest pain, palpitations, shortness of breath, coughing, wheezing, heart burn, reflux, skin lesions, unintended weight loss/gain, sensitivity to heat/cold, changes in sensation, changes in vision/hearing/smell/taste, myalgias, arthralgias, and has no other acute complaints or concerning symptoms to report at this time.      Physical Examination   There were no vitals filed for this visit.    Estimated body mass index is 26.26 kg/m² as calculated from the following:    Height as of 11/6/24: 162.6 cm (64\").    Weight as of 11/6/24: 69.4 kg (153 lb).  PREVIOUS WEIGHTS:   Wt Readings from Last 5 Encounters:   11/06/24 69.4 kg (153 lb)   08/05/24 73.9 kg (163 lb)   10/24/23 78 kg (172 lb)   08/23/23 75.3 kg (166 lb)   07/10/23 78.5 kg (173 lb)       Physical Examination:  Gen: awake, alert, sitting up in chair in no acute distress  HEENT: NCAT, EOMI, anicteric sclerae  CV: RRR, normotensive  Resp: nonlabored on room air, sats appropriate  Abd: soft, nontender, nondistended without palpable " "mass  Rectal:   External: No external hemorrhoidal disease apparent, and no tenderness to palpation of the anal verge   RAUL: No stool or blood in the rectal vault, palpable hemorrhoid column on left   *Please refer to anoscopy report for further details.*  MSK: moves all four, no c/c/e  Neuro: no focal deficits, cranial nerves grossly intact  Psy:  appropriate, cooperative      Data Review     Labs:  CBC  Lab Results   Component Value Date    WBC 6.3 03/05/2024    HGB 13.0 03/05/2024    HCT 39.2 03/05/2024     03/05/2024      BMP  Lab Results   Component Value Date     12/13/2022    K 4.0 12/13/2022     12/13/2022    CO2 24.0 12/13/2022    BUN 12 12/13/2022    CREATININE 0.60 07/21/2023    GLUCOSE 118 (H) 12/13/2022    CALCIUM 9.5 12/13/2022    MG 2.0 12/13/2022      LFTs  Lab Results   Component Value Date    PROTEINTOT 7.7 12/13/2022    ALBUMIN 4.90 12/13/2022    BILITOT <0.2 12/13/2022    ALT 16 12/13/2022    AST 19 12/13/2022    ALKPHOS 7 (L) 12/13/2022      Coag  No results found for: \"PROTIME\", \"PTT\", \"INR\"   Cardiac markers  Lab Results   Component Value Date    TROPONINT <0.010 12/13/2022      Iron Studies  No results found for: \"IRON\", \"TRANSFERRIN\"       Urine:  UA  Lab Results   Component Value Date    COLORU Yellow 12/13/2022    CLARITYU Clear 12/13/2022    SPECGRAVUR 1.011 12/13/2022    PHUR 6.5 12/13/2022    PROTEINUA Negative 12/13/2022    GLUCOSEU Negative 12/13/2022    KETONESU Negative 12/13/2022    BILIRUBINUR Negative 12/13/2022    BLOODU Negative 12/13/2022    NITRITEU Negative 12/13/2022    LEUKOCYTESUR Negative 12/13/2022    UROBILINOGEN 0.2 E.U./dL 12/13/2022      UDS  Lab Results   Component Value Date    LABOPIASCN Negative 12/13/2022    AMPHETSCREEN Negative 12/13/2022    BARBITSCNUR Negative 12/13/2022    LABBENZSCN Negative 12/13/2022    COCAINEUR Negative 12/13/2022    LABMETHSCN Negative 12/13/2022    PCPUR Negative 12/13/2022    THCURSCR Negative 12/13/2022    " "METAMPSCNUR Negative 12/13/2022          Other  Blood Alcohol (BAL):      Serum Acetaminophen: Invalid input(s): \"ACETAMINO\"  Serum Salicylate:      Microbiology:  [unfilled]      Radiology Impressions:  No radiology results for the last 30 days.      Pathology:  Lab Results   Component Value Date    NOTE  05/26/2023     This report may contain a detailed description of human tissue sent by a health care provider to the laboratory for pathologic evaluation. The content of this report is essential for diagnosis and may provide important critical findings. This information may be unfamiliar to patients to review without a medical professional present. It is advised that the patient review this report in the presence of a health care provider who can answer questions and explain the results.      CASEREPORT  05/26/2023     Surgical Pathology Report                         Case: [id number]                                  Authorizing Provider:  Arturo Kennedy MD       Collected:           05/26/2023 08:12 AM          Ordering Location:     [facility] OR  Received:            05/26/2023 09:28 AM          Pathologist:           Carley Pearson MD                                                         Specimen:    Fallopian Tubes, Bilateral, RIGHT AND LEFT FALLOPIAN TUBES                                 FINALDX  05/26/2023     Bilateral fallopian tubes, bilateral salpingectomy:  Benign fallopian tubes.      GROSSDES  05/26/2023     1. Fallopian Tubes, Bilateral.  Specimen #1 is received in a formalin filled container labeled with the patient's name, date of birth, and designated bilateral fallopian tubes.  The specimen consists of two fimbriated fallopian tube segments.  One segment is arbitrarily designated \"A\" and measures 4.3 cm in length by 0.6 cm in diameter.  The serosal surface is tan-pink, smooth and glistening with multiple paratubal serous cysts measuring up to 1.1 cm.  The cut surface is " "unremarkable.  Segment \"B\" measures 4.4 cm in length by 0.5 cm in diameter.  The serosal surface is tan-pink, smooth and glistening with a paratubal serous cysts measuring up to 0.8 cm.  The cut surface is unremarkable.  Representative sections of segment \"A\" are submitted in block 1A.  Representative sections of segment \"B\" are submitted in block 1B.          MICRO  05/26/2023     Microscopic evaluation completed.         * Cannot find OR log *      Problem List     Patient Active Problem List   Diagnosis    Sterilization consult            Assessment/Plan   Kaley Huff is a 34 y.o. female with prolapsing internal hemorrhoids, that have bled occasionally.  These recently worsened after her recent tummy tuck, but seem to be improving.  We have treated with rubber band ligation of the left lateral column, which did have some sequela of recent bleeding, and I suspect is the hemorrhoid that she felt had prolapsed most recently.  Will see her back in 4 weeks for follow-up, and possible banding of the right exterior column, which was also enlarged.      We discussed the nature of hemorrhoids as physiologic rather than pathologic, the role of conservative management, and options for intervention including rubber band ligation and surgical hemorrhoidectomy.  We discussed that hemorrhoidectomy is reserved for refractory external hemorrhoids and internal hemorrhoids that do not respond to conservative management or banding. I have recommended rubber band ligation for her internal disease and have treated with RBL of the LL column.  She  was again counseled as to appropriate fiber and water intake, appropriate toileting habits, and he was provided information packet with this information prior to his discharge from clinic.  She was counseled to call our office or return to the emergency department with any significant bleeding per rectum, or any problems following examination/treatment today.  Ample time was provided " for questions all of which were answered to the patient's apparent satisfaction.     Smoking cessation: Never smoker  BMI: BMI is less than 30. Counseling is not indicated.    Med rec complete: yes  VTE: VTE PPX is not indicated.    Time Spent: I spent 30 minutes caring for Kaley on this date of service. This time includes time spent by me, excluding procedures, in the following activities: preparing for the clinic visit, reviewing tests, obtaining and/or reviewing a separately obtained history, performing a medically appropriate examination and/or evaluation, counseling and educating the patient/family/caregiver, ordering medications, tests, or procedures, and documenting information in the medical record.     Homero Engle MD  11/13/2024   10:14 CST

## 2024-11-15 ENCOUNTER — OFFICE VISIT (OUTPATIENT)
Dept: SURGERY | Facility: CLINIC | Age: 34
End: 2024-11-15
Payer: COMMERCIAL

## 2024-11-15 VITALS
WEIGHT: 153 LBS | HEIGHT: 64 IN | OXYGEN SATURATION: 98 % | HEART RATE: 78 BPM | DIASTOLIC BLOOD PRESSURE: 88 MMHG | BODY MASS INDEX: 26.12 KG/M2 | SYSTOLIC BLOOD PRESSURE: 126 MMHG

## 2024-11-15 DIAGNOSIS — K64.8 HEMORRHOID PROLAPSE: Primary | ICD-10-CM

## 2024-11-15 NOTE — LETTER
November 15, 2024     Julia Robison DO  [address]  [address]  [place] [address]    Patient: Kaley Huff   YOB: 1990   Date of Visit: 11/15/2024     Dear Julia Robison DO:       I have seen Kaley Huff in my office today for evaluation of her anorectal complaints.  On my exam, she does have very enlarged internal hemorrhoids, 1 of which had Seckel of recent bleeding.  I have elected to treat her with rubber band ligation of her hemorrhoids, and have also counseled her extensively regarding conservative measures to improve her hemorrhoidal disease.  She will see me for repeat treatment and follow-up in 4 weeks.    Should you have any questions or concerns about her care, please feel free to reach out.  It is privilege to follow Kaley along with you.             Sincerely,        Homero Engle MD        CC: No Homero Quijano MD  11/15/24 1006  Sign when Signing Visit    General Surgery Rubber Band Ligation and Anoscopy Procedure Note    Indication: Bleeding, prolapsing internal hemorrhoids    Procedure: The patient was placed in the left lateral decubitus position.  The anoscope was gently inserted and the bowel was inspected.  Visualization was good.  Mucosa was generally pink and healthy appearing.  Anoscopy revealed enlarged right posterior column, normal-appearing right anterior column, and enlarged left lateral column with small ulceration and sequela of recent bleeding. Decision was made to place a rubber band on the left lateral hemorrhoid column, which appeared to be the most enlarged and inflamed, with ulceration and sequelae of recent bleeding. The pedicle of that hemorrhoid was grasped and retracted into the McGiveny ligator, and two bands were applied in the usual fashion. The patient tolerated this well, without significant discomfort or significant bleeding.     Information sheets, follow-up appointments, and return precautions  were given to the patient prior to discharge from clinic.       Homero Engle MD  11/15/2024   10:05 CST      Homero Engle MD  11/15/24 1125  Sign when Signing Visit    [facility] General Surgery Clinic History and Physical  Kaley Huff  MRN: [id number]  Age: 34 y.o. female   YOB: 1990    SUBJECTIVE  History of Presenting Illness   Patient is a 34 y.o. female presenting for hemorrhoid evaluation.  Pertinent past medical history includes anxiety, asthma, hypertension, recently underwent tummy tuck, breast augmentation; she describes symptoms as prolapse with some bleeding and minimal pain, and describes bowel movements as generally soft and usually spends less than 5 minutes on the commode with occasional straining.    Colonoscopy: Never  Ob/Gyn Hx: 2 children born via   CRC Hx: None-no first-degree relatives with colorectal cancer history  Anticoagulation/Antiplatelet Rx: None      Past Medical History     Past Medical History:  Past Medical History:   Diagnosis Date   • Anxiety    • Asthma    • Eczema    • Hypertension    • Tachycardia        PCP: Julia Robison DO    Past Surgical History:  Past Surgical History:   Procedure Laterality Date   •  SECTION      X 2   • ENDOSCOPY     • SALPINGECTOMY Bilateral 2023    Procedure: SALPINGECTOMY LAPAROSCOPIC for sterilization;  Surgeon: Arturo Kennedy MD;  Location: [facility];  Service: Obstetrics/Gynecology;  Laterality: Bilateral;   • TONSILLECTOMY     • WISDOM TOOTH EXTRACTION         Family History:  Family History   Problem Relation Age of Onset   • Anemia Mother    • Asthma Mother    • Hypertension Mother    • Valvular heart disease Mother    • Hypertension Father    • Heart disease Father        Social History:  Social History     Tobacco Use   • Smoking status: Never     Passive exposure: Never   • Smokeless tobacco: Never   Substance Use Topics   • Alcohol use: Yes     Comment: occasional  "      Allergies:  Allergies   Allergen Reactions   • Amoxicillin Anaphylaxis   • Bactrim [Sulfamethoxazole-Trimethoprim] Anaphylaxis   • Keflex [Cephalexin] Anaphylaxis   • Penicillins Anaphylaxis   • Zithromax [Azithromycin] Anaphylaxis   • Iodine Hives     States she had \"a single hive\" after CT dye   • Erythromycin Rash       Medications:  Current Outpatient Medications   Medication Instructions   • Albuterol-Budesonide (Airsupra) 90-80 MCG/ACT aerosol Inhale.   • Anucort-HC 25 MG suppository    • cetirizine (zyrTEC) 10 MG tablet 1 tablet, 2 Times Daily   • EPINEPHrine (EPIPEN) 0.3 mg   • Hydrocortisone, Perianal, (Anusol-HC) 2.5 % rectal cream Rectal, 2 Times Daily, Apply rectally 2 times daily   • Loratadine 10 MG capsule 1 capsule, Daily   • melatonin 12 mg, Nightly   • metoprolol succinate XL (TOPROL-XL) 50 mg, Oral, Daily   • montelukast (SINGULAIR) 10 MG tablet    • Progesterone (PROMETRIUM) 150 mg, Daily   • Triamcinolone Acetonide (NASACORT) 55 MCG/ACT nasal inhaler 2 sprays, Once As Needed         Review of Systems   She otherwise denies lightheadedness, dizziness, fainting, passing out, headache, nausea, vomiting, chest pain, palpitations, shortness of breath, coughing, wheezing, heart burn, reflux, skin lesions, unintended weight loss/gain, sensitivity to heat/cold, changes in sensation, changes in vision/hearing/smell/taste, myalgias, arthralgias, and has no other acute complaints or concerning symptoms to report at this time.      Physical Examination   There were no vitals filed for this visit.    Estimated body mass index is 26.26 kg/m² as calculated from the following:    Height as of 11/6/24: 162.6 cm (64\").    Weight as of 11/6/24: 69.4 kg (153 lb).  PREVIOUS WEIGHTS:   Wt Readings from Last 5 Encounters:   11/06/24 69.4 kg (153 lb)   08/05/24 73.9 kg (163 lb)   10/24/23 78 kg (172 lb)   08/23/23 75.3 kg (166 lb)   07/10/23 78.5 kg (173 lb)       Physical Examination:  Gen: awake, alert, sitting " "up in chair in no acute distress  HEENT: NCAT, EOMI, anicteric sclerae  CV: RRR, normotensive  Resp: nonlabored on room air, sats appropriate  Abd: soft, nontender, nondistended without palpable mass  Rectal:   External: No external hemorrhoidal disease apparent, and no tenderness to palpation of the anal verge   RAUL: No stool or blood in the rectal vault, palpable hemorrhoid column on left   *Please refer to anoscopy report for further details.*  MSK: moves all four, no c/c/e  Neuro: no focal deficits, cranial nerves grossly intact  Psy:  appropriate, cooperative      Data Review     Labs:  CBC  Lab Results   Component Value Date    WBC 6.3 03/05/2024    HGB 13.0 03/05/2024    HCT 39.2 03/05/2024     03/05/2024      BMP  Lab Results   Component Value Date     12/13/2022    K 4.0 12/13/2022     12/13/2022    CO2 24.0 12/13/2022    BUN 12 12/13/2022    CREATININE 0.60 07/21/2023    GLUCOSE 118 (H) 12/13/2022    CALCIUM 9.5 12/13/2022    MG 2.0 12/13/2022      LFTs  Lab Results   Component Value Date    PROTEINTOT 7.7 12/13/2022    ALBUMIN 4.90 12/13/2022    BILITOT <0.2 12/13/2022    ALT 16 12/13/2022    AST 19 12/13/2022    ALKPHOS 7 (L) 12/13/2022      Coag  No results found for: \"PROTIME\", \"PTT\", \"INR\"   Cardiac markers  Lab Results   Component Value Date    TROPONINT <0.010 12/13/2022      Iron Studies  No results found for: \"IRON\", \"TRANSFERRIN\"       Urine:  UA  Lab Results   Component Value Date    COLORU Yellow 12/13/2022    CLARITYU Clear 12/13/2022    SPECGRAVUR 1.011 12/13/2022    PHUR 6.5 12/13/2022    PROTEINUA Negative 12/13/2022    GLUCOSEU Negative 12/13/2022    KETONESU Negative 12/13/2022    BILIRUBINUR Negative 12/13/2022    BLOODU Negative 12/13/2022    NITRITEU Negative 12/13/2022    LEUKOCYTESUR Negative 12/13/2022    UROBILINOGEN 0.2 E.U./dL 12/13/2022      UDS  Lab Results   Component Value Date    LABOPIASCN Negative 12/13/2022    AMPHETSCREEN Negative 12/13/2022    " "BARBITSCNUR Negative 12/13/2022    LABBENZSCN Negative 12/13/2022    COCAINEUR Negative 12/13/2022    LABMETHSCN Negative 12/13/2022    PCPUR Negative 12/13/2022    THCURSCR Negative 12/13/2022    METAMPSCNUR Negative 12/13/2022          Other  Blood Alcohol (BAL):      Serum Acetaminophen: Invalid input(s): \"ACETAMINO\"  Serum Salicylate:      Microbiology:  @[facility]@      Radiology Impressions:  No radiology results for the last 30 days.      Pathology:  Lab Results   Component Value Date    NOTE  05/26/2023     This report may contain a detailed description of human tissue sent by a health care provider to the laboratory for pathologic evaluation. The content of this report is essential for diagnosis and may provide important critical findings. This information may be unfamiliar to patients to review without a medical professional present. It is advised that the patient review this report in the presence of a health care provider who can answer questions and explain the results.      CASEREPORT  05/26/2023     Surgical Pathology Report                         Case: [id number]                                  Authorizing Provider:  Arturo Kennedy MD       Collected:           05/26/2023 08:12 AM          Ordering Location:     [facility] OR  Received:            05/26/2023 09:28 AM          Pathologist:           Carley Pearson MD                                                         Specimen:    Fallopian Tubes, Bilateral, RIGHT AND LEFT FALLOPIAN TUBES                                 FINALDX  05/26/2023     Bilateral fallopian tubes, bilateral salpingectomy:  Benign fallopian tubes.      GROSSDES  05/26/2023     1. Fallopian Tubes, Bilateral.  Specimen #1 is received in a formalin filled container labeled with the patient's name, date of birth, and designated bilateral fallopian tubes.  The specimen consists of two fimbriated fallopian tube segments.  One segment is arbitrarily designated \"A\" " "and measures 4.3 cm in length by 0.6 cm in diameter.  The serosal surface is tan-pink, smooth and glistening with multiple paratubal serous cysts measuring up to 1.1 cm.  The cut surface is unremarkable.  Segment \"B\" measures 4.4 cm in length by 0.5 cm in diameter.  The serosal surface is tan-pink, smooth and glistening with a paratubal serous cysts measuring up to 0.8 cm.  The cut surface is unremarkable.  Representative sections of segment \"A\" are submitted in block 1A.  Representative sections of segment \"B\" are submitted in block 1B.          MICRO  05/26/2023     Microscopic evaluation completed.         * Cannot find OR log *      Problem List     Patient Active Problem List   Diagnosis   • Sterilization consult            Assessment/Plan   Kaley Huff is a 34 y.o. female with prolapsing internal hemorrhoids, that have bled occasionally.  These recently worsened after her recent tummy tuck, but seem to be improving.  We have treated with rubber band ligation of the left lateral column, which did have some sequela of recent bleeding, and I suspect is the hemorrhoid that she felt had prolapsed most recently.  Will see her back in 4 weeks for follow-up, and possible banding of the right exterior column, which was also enlarged.      We discussed the nature of hemorrhoids as physiologic rather than pathologic, the role of conservative management, and options for intervention including rubber band ligation and surgical hemorrhoidectomy.  We discussed that hemorrhoidectomy is reserved for refractory external hemorrhoids and internal hemorrhoids that do not respond to conservative management or banding. I have recommended rubber band ligation for her internal disease and have treated with RBL of the LL column.  She  was again counseled as to appropriate fiber and water intake, appropriate toileting habits, and he was provided information packet with this information prior to his discharge from clinic.  She was " counseled to call our office or return to the emergency department with any significant bleeding per rectum, or any problems following examination/treatment today.  Ample time was provided for questions all of which were answered to the patient's apparent satisfaction.     Smoking cessation: Never smoker  BMI: BMI is less than 30. Counseling is not indicated.    Med rec complete: yes  VTE: VTE PPX is not indicated.    Time Spent: I spent 30 minutes caring for Kaley on this date of service. This time includes time spent by me, excluding procedures, in the following activities: preparing for the clinic visit, reviewing tests, obtaining and/or reviewing a separately obtained history, performing a medically appropriate examination and/or evaluation, counseling and educating the patient/family/caregiver, ordering medications, tests, or procedures, and documenting information in the medical record.     Homero Engle MD  11/13/2024   10:14 CST

## 2024-11-15 NOTE — PROGRESS NOTES
General Surgery Rubber Band Ligation and Anoscopy Procedure Note    Indication: Bleeding, prolapsing internal hemorrhoids    Procedure: The patient was placed in the left lateral decubitus position.  The anoscope was gently inserted and the bowel was inspected.  Visualization was good.  Mucosa was generally pink and healthy appearing.  Anoscopy revealed enlarged right posterior column, normal-appearing right anterior column, and enlarged left lateral column with small ulceration and sequela of recent bleeding. Decision was made to place a rubber band on the left lateral hemorrhoid column, which appeared to be the most enlarged and inflamed, with ulceration and sequelae of recent bleeding. The pedicle of that hemorrhoid was grasped and retracted into the McGiveny ligator, and two bands were applied in the usual fashion. The patient tolerated this well, without significant discomfort or significant bleeding.     Information sheets, follow-up appointments, and return precautions were given to the patient prior to discharge from clinic.       Homero Engle MD  11/15/2024   10:05 CST

## 2024-11-15 NOTE — PATIENT INSTRUCTIONS
General Surgery Clinic Discharge Instructions: RUBBER BAND LIGATION OF HEMORRHOIDS    This method involves the application of small rubber bands to the hemorrhoids. The bands, being elastic, tighten down and destroy the hemorrhoids trapped inside the band, just as if they were cut out with a scalpel.  Only internal hemorrhoids are treated this way. These hemorrhoids do not have “pain” fibers. For this reason, we do not have to use any anesthetic. You will feel and hear a snap or thump as the bands are applied. About 25% of patients experience mild, dull anal discomfort lasting for 2-3 days following the procedure. You may feel a dull ache, not severe pain, for a few hours. Many like to describe the feeling of discomfort thus: “I feel as if I want to have a bowel movement” or “there seems to be stool in my rectum near the opening”. This feeling will occur as soon as the rubber bands are applied. Sitting in a hot tub will usually relieve this sensation.  In 24-48 hours, the hemorrhoid caught in the rubber band will be completely destroyed. The destroyed hemorrhoid will drop off in approximately 7-14 days. You will usually not notice this, although some patients may notice spotting at that time. By avoiding constipation, straining or loose stools, you should experience no trouble with marked bleeding.      AFTER TREATMENT  Activity  You may go ahead with your usual activity.  You may drive your car immediately after the treatment.  AVOID heavy lifting, if possible (for the first 24 hours after your procedure.)    Diet  You can eat what you like, but follow fiber recommendations per the information sheet we provided to you today  Drink a MINIMUM of 10 glasses of fluid daily.    Medications  Take your medications as prescribed.  If you usually take Aspirin 81mg or 325mg per day you may continue to take this,  otherwise NO aspirin for 14 days.  TYLENOL (not aspirin) if needed, may be taken for pain.  Take warm sitz  baths 3-4 times daily, especially after each bowel movement, 15 minutes at a time. The water temperature should be as hot as you can tolerate. Check the water temperature with your elbow. You will find the sitz bath soothing and relaxing.    Bowel Movements  DO NOT STRAIN to have a bowel movement.  DO NOT SIT on the toilet for more than 5 minutes.  NEVER read while sitting on the toilet! Take the library & electronics OUT of the bathroom    Problems  Some spotting or bleeding is to be expected even up to 14 days after the procedure.  Report to the ER immediately if there is profuse bleeding.  About 2% of the patients treated with Rubber Band Ligation develop thrombosis of an external hemorrhoid which may require excision.    Hemorrhoids may reoccur. The best preventive measures are proper diet and toilet habits.    REMEMBER: RECTAL BLEEDING, WHICH MAY APPEAR IN THE FUTURE, SHOULD NOT BE DISREGARDED. OTHER CAUSES OF BLEEDING SUCH AS FISSURES, POLYPS OR CANCER CAN APPEAR AND SHOULD BE INVESTIGATED.       General Surgery Clinic Discharge Instructions      Kaley Huff  11/15/24      Diagnosis: internal hemorrhoids    Medications/Treatments:   (Take all medications as prescribed by your provider)  Continue fiber supplementation, limiting, commode and appropriate toileting habits, and sitz baths when symptomatic as directed and are in your information folder  Discontinue steroid containing suppositories      Follow-up: 4 weeks      Please call our office at [phone] with any issues, questions, or concerns.      Homero Engle MD  11/15/2024   10:04 CST

## 2024-12-13 ENCOUNTER — OFFICE VISIT (OUTPATIENT)
Dept: SURGERY | Facility: CLINIC | Age: 34
End: 2024-12-13
Payer: COMMERCIAL

## 2024-12-13 VITALS
SYSTOLIC BLOOD PRESSURE: 129 MMHG | BODY MASS INDEX: 26.12 KG/M2 | DIASTOLIC BLOOD PRESSURE: 84 MMHG | WEIGHT: 153 LBS | HEIGHT: 64 IN

## 2024-12-13 DIAGNOSIS — K64.8 INTERNAL HEMORRHOID: ICD-10-CM

## 2024-12-13 DIAGNOSIS — K64.8 HEMORRHOID PROLAPSE: Primary | ICD-10-CM

## 2024-12-13 NOTE — PROGRESS NOTES
General Surgery Rubber Band Ligation and Anoscopy Procedure Note    Indication: Symptomatic hemorrhoids    Procedure: The patient was placed in the left lateral decubitus position.  The anoscope was gently inserted and the bowel was inspected.  Visualization was good.  Mucosa was generally pink and healthy appearing.  Anoscopy revealed well-healed RP column, unremarkable RA column, and enlarged and bulky left lateral column. Decision was made to place a rubber band on the LL hemorrhoid column, which appeared to be the most enlarged and inflamed, with no sequelae of recent bleeding. The pedicle of that hemorrhoid was grasped and retracted into the McGiveny ligator, and two bands were applied in the usual fashion. The patient tolerated this well, without significant discomfort or significant bleeding.     Information sheets, follow-up appointments, and return precautions were given to the patient prior to discharge from clinic.       Homero Engle MD  12/13/2024   10:13 CST

## 2024-12-13 NOTE — PROGRESS NOTES
"  [facility] General Surgery Clinic Progress Note  Kaley Huff  MRN: [id number]  Age: 34 y.o. female   YOB: 1990      SUBJECTIVE  History of Presenting Illness   Patient is a 34 y.o. female presenting for follow-up hemorrhoid evaluation.  Initially presented on 11/15/2024, pertinent past medical history includes anxiety, asthma, hypertension, recent tummy tuck, breast augmentation; initially described symptoms as prolapse with some bleeding and minimal pain, and describes bowel movements as generally soft and usually spends less than 5 minutes on the commode with occasional straining.    Since her prior visit, she says that her symptoms have greatly resolved, and she is feeling better, although \"not 100%\".  She has generally been compliant with conservative measures.      Prior treatment:   11/15/2024: RBL of RP column    Colonoscopy: Never  Ob/Gyn Hx: 2 children born via   CRC Hx: None-no first-degree relatives with colorectal cancer history  Anticoagulation/Antiplatelet Rx: None      Past Medical History     Past Medical History:  Past Medical History:   Diagnosis Date    Anxiety     Asthma     Eczema     Hypertension     Tachycardia        PCP: Julia Robison DO    Past Surgical History:  Past Surgical History:   Procedure Laterality Date     SECTION      X 2    ENDOSCOPY      SALPINGECTOMY Bilateral 2023    Procedure: SALPINGECTOMY LAPAROSCOPIC for sterilization;  Surgeon: Arturo Kennedy MD;  Location: [facility];  Service: Obstetrics/Gynecology;  Laterality: Bilateral;    TONSILLECTOMY      WISDOM TOOTH EXTRACTION         Family History:  Family History   Problem Relation Age of Onset    Anemia Mother     Asthma Mother     Hypertension Mother     Valvular heart disease Mother     Hypertension Father     Heart disease Father        Social History:  Social History     Tobacco Use    Smoking status: Never     Passive exposure: Never    Smokeless tobacco: " "Never   Substance Use Topics    Alcohol use: Yes     Comment: occasional       Allergies:  Allergies   Allergen Reactions    Amoxicillin Anaphylaxis    Bactrim [Sulfamethoxazole-Trimethoprim] Anaphylaxis    Keflex [Cephalexin] Anaphylaxis    Penicillins Anaphylaxis    Zithromax [Azithromycin] Anaphylaxis    Iodine Hives     States she had \"a single hive\" after CT dye    Erythromycin Rash       Medications:  Current Outpatient Medications   Medication Instructions    Albuterol-Budesonide (Airsupra) 90-80 MCG/ACT aerosol Inhale.    Anucort-HC 25 MG suppository     cetirizine (zyrTEC) 10 MG tablet 1 tablet, 2 Times Daily    EPINEPHrine (EPIPEN) 0.3 mg    Hydrocortisone, Perianal, (Anusol-HC) 2.5 % rectal cream Rectal, 2 Times Daily, Apply rectally 2 times daily    Loratadine 10 MG capsule 1 capsule, Daily    melatonin 12 mg, Nightly    metoprolol succinate XL (TOPROL-XL) 50 mg, Oral, Daily    montelukast (SINGULAIR) 10 MG tablet     Progesterone (PROMETRIUM) 150 mg, Daily    Triamcinolone Acetonide (NASACORT) 55 MCG/ACT nasal inhaler 2 sprays, Once As Needed         Review of Systems   She otherwise denies lightheadedness, dizziness, fainting, passing out, headache, nausea, vomiting, chest pain, palpitations, shortness of breath, coughing, wheezing, heart burn, reflux, skin lesions, unintended weight loss/gain, sensitivity to heat/cold, changes in sensation, changes in vision/hearing/smell/taste, myalgias, arthralgias, and has no other acute complaints or concerning symptoms to report at this time.      Physical Examination     Vitals:    12/13/24 1001   BP: 129/84   Weight: 69.4 kg (153 lb)   Height: 162.6 cm (64\")       Estimated body mass index is 26.26 kg/m² as calculated from the following:    Height as of this encounter: 162.6 cm (64\").    Weight as of this encounter: 69.4 kg (153 lb).  PREVIOUS WEIGHTS:   Wt Readings from Last 5 Encounters:   12/13/24 69.4 kg (153 lb)   11/15/24 69.4 kg (153 lb)   11/06/24 69.4 " kg (153 lb)   08/05/24 73.9 kg (163 lb)   10/24/23 78 kg (172 lb)       Physical Examination:  Gen: awake, alert, sitting up in chair in no acute distress  HEENT: NCAT, EOMI, anicteric sclerae  CV: RRR, normotensive  Resp: nonlabored on room air, sats appropriate  Abd: soft, nontender, nondistended without palpable mass  Rectal:   External: No external hemorrhoidal disease, no tenderness to palpation of the anal verge, no other lesions noted   RAUL: No stool or blood in the rectal vault, good tone, palpable hemorrhoid column on left, no other palpable lesion   *Please refer to anoscopy report for further details.*  MSK: moves all four, no c/c/e  Neuro: no focal deficits, cranial nerves grossly intact  Psy:  appropriate, cooperative      Data Review   No recent labs or imaging to review.    Problem List     Patient Active Problem List   Diagnosis    Sterilization consult    Internal hemorrhoid            Assessment/Plan   Kaley Huff is a 34 y.o. female with prolapsing internal hemorrhoids that have occasionally bled.  These have improved greatly since banding of the RP column, which appears greatly improved on today's examination.  Her left lateral column was still enlarged, and I have banded it as well today.  She will follow-up in 4 weeks for repeat assessment.    We discussed the nature of hemorrhoids as physiologic rather than pathologic, the role of conservative management, and options for intervention including rubber band ligation and surgical hemorrhoidectomy.  We discussed that hemorrhoidectomy is reserved for refractory external hemorrhoids and internal hemorrhoids that do not respond to conservative management or banding. I have recommended ongoing RBL of the affected column and have treated with RBL of LL column.  She  was again counseled as to appropriate fiber and water intake, appropriate toileting habits, and he was provided information packet with this information prior to his discharge from  clinic.  She was counseled to call our office or return to the emergency department with any significant bleeding per rectum, or any problems following examination/treatment today.  Ample time was provided for questions all of which were answered to the patient's apparent satisfaction.     Smoking cessation: Never smoker  BMI: BMI is less than 30. Counseling is not indicated.    Med rec complete: yes  VTE: VTE PPX is not indicated.     Time Spent: I spent 20 minutes caring for Kaley on this date of service. This time includes time spent by me, excluding procedures, in the following activities: preparing for the clinic visit, reviewing tests, obtaining and/or reviewing a separately obtained history, performing a medically appropriate examination and/or evaluation, counseling and educating the patient/family/caregiver, ordering medications, tests, or procedures, and documenting information in the medical record.     Homero Engle MD  12/13/2024   10:33 CST

## 2024-12-13 NOTE — PATIENT INSTRUCTIONS
General Surgery Clinic Discharge Instructions: RUBBER BAND LIGATION OF HEMORRHOIDS    This method involves the application of small rubber bands to the hemorrhoids. The bands, being elastic, tighten down and destroy the hemorrhoids trapped inside the band, just as if they were cut out with a scalpel.  Only internal hemorrhoids are treated this way. These hemorrhoids do not have “pain” fibers. For this reason, we do not have to use any anesthetic. You will feel and hear a snap or thump as the bands are applied. About 25% of patients experience mild, dull anal discomfort lasting for 2-3 days following the procedure. You may feel a dull ache, not severe pain, for a few hours. Many like to describe the feeling of discomfort thus: “I feel as if I want to have a bowel movement” or “there seems to be stool in my rectum near the opening”. This feeling will occur as soon as the rubber bands are applied. Sitting in a hot tub will usually relieve this sensation.  In 24-48 hours, the hemorrhoid caught in the rubber band will be completely destroyed. The destroyed hemorrhoid will drop off in approximately 7-14 days. You will usually not notice this, although some patients may notice spotting at that time. By avoiding constipation, straining or loose stools, you should experience no trouble with marked bleeding.      AFTER TREATMENT  Activity  You may go ahead with your usual activity.  You may drive your car immediately after the treatment.  AVOID heavy lifting, if possible (for the first 24 hours after your procedure.)    Diet  You can eat what you like, but follow fiber recommendations per the information sheet we provided to you today  Drink a MINIMUM of 10 glasses of fluid daily.    Medications  Take your medications as prescribed.  If you usually take Aspirin 81mg or 325mg per day you may continue to take this,  otherwise NO aspirin for 14 days.  TYLENOL (not aspirin) if needed, may be taken for pain.  Take warm sitz  baths 3-4 times daily, especially after each bowel movement, 15 minutes at a time. The water temperature should be as hot as you can tolerate. Check the water temperature with your elbow. You will find the sitz bath soothing and relaxing.    Bowel Movements  DO NOT STRAIN to have a bowel movement.  DO NOT SIT on the toilet for more than 5 minutes.  NEVER read while sitting on the toilet! Take the library & [place] OUT of the bathroom    Problems  Some spotting or bleeding is to be expected even up to 14 days after the procedure.  Report to the ER immediately if there is profuse bleeding.  About 2% of the patients treated with Rubber Band Ligation develop thrombosis of an external hemorrhoid which may require excision.    Hemorrhoids may reoccur. The best preventive measures are proper diet and toilet habits.    REMEMBER: RECTAL BLEEDING, WHICH MAY APPEAR IN THE FUTURE, SHOULD NOT BE DISREGARDED. OTHER CAUSES OF BLEEDING SUCH AS FISSURES, POLYPS OR CANCER CAN APPEAR AND SHOULD BE INVESTIGATED.   General Surgery Clinic Discharge Instructions      Kaley Huff  12/13/24      Diagnosis: Symptomatic hemorrhoid    Medications/Treatments:   (Take all medications as prescribed by your provider)  Continue conservative management with fiber intake, appropriate toileting, and sitz baths when symptomatic      Surgical Plan: No plans for surgery at this time      Follow-up: 4 weeks      Please call our office at [phone] with any issues, questions, or concerns.      Homero Engle MD  12/13/2024   10:14 CST

## 2025-01-20 NOTE — PROGRESS NOTES
[facility] General Surgery Clinic Progress Note  Kaley Huff  MRN: [id number]  Age: 34 y.o. female   YOB: 1990      SUBJECTIVE  History of Presenting Illness   Patient is a 34 y.o. female presenting for follow-up hemorrhoid evaluation.  Initially presented on 11/15/2024, pertinent past medical history includes anxiety, asthma, hypertension, recent tummy tuck, breast augmentation; initially described symptoms as prolapse with some bleeding and minimal pain, and describes bowel movements as generally soft and usually spends less than 5 minutes on the commode with occasional straining.     Since her prior visit on 2024, she reports that she overall doing very well.  Has noticed no more pain, but several days after her prior appointment she noted that she had some bleeding from her anus following sexual relations with her , though she is unsure if this is related.        Prior treatment:   2024: RBL of LL column  11/15/2024: RBL of RP column     Colonoscopy: Never  Ob/Gyn Hx: 2 children born via   CRC Hx: None-no first-degree relatives with colorectal cancer history  Anticoagulation/Antiplatelet Rx: None        Past Medical History     Past Medical History:  Past Medical History:   Diagnosis Date    Anxiety     Asthma     Eczema     Hypertension     Tachycardia        PCP: Julia Robison DO    Past Surgical History:  Past Surgical History:   Procedure Laterality Date     SECTION      X 2    ENDOSCOPY      SALPINGECTOMY Bilateral 2023    Procedure: SALPINGECTOMY LAPAROSCOPIC for sterilization;  Surgeon: Arturo Kennedy MD;  Location: [facility];  Service: Obstetrics/Gynecology;  Laterality: Bilateral;    TONSILLECTOMY      WISDOM TOOTH EXTRACTION         Family History:  Family History   Problem Relation Age of Onset    Anemia Mother     Asthma Mother     Hypertension Mother     Valvular heart disease Mother     Hypertension Father     Heart  "disease Father        Social History:  Social History     Tobacco Use    Smoking status: Never     Passive exposure: Never    Smokeless tobacco: Never   Substance Use Topics    Alcohol use: Yes     Comment: occasional       Allergies:  Allergies   Allergen Reactions    Amoxicillin Anaphylaxis    Bactrim [Sulfamethoxazole-Trimethoprim] Anaphylaxis    Keflex [Cephalexin] Anaphylaxis    Penicillins Anaphylaxis    Zithromax [Azithromycin] Anaphylaxis    Iodine Hives     States she had \"a single hive\" after CT dye    Erythromycin Rash       Medications:  Current Outpatient Medications   Medication Instructions    Albuterol-Budesonide (Airsupra) 90-80 MCG/ACT aerosol Inhale.    Anucort-HC 25 MG suppository     cetirizine (zyrTEC) 10 MG tablet 1 tablet, 2 Times Daily    EPINEPHrine (EPIPEN) 0.3 mg    Hydrocortisone, Perianal, (Anusol-HC) 2.5 % rectal cream Rectal, 2 Times Daily, Apply rectally 2 times daily    Loratadine 10 MG capsule 1 capsule, Daily    melatonin 12 mg, Nightly    metoprolol succinate XL (TOPROL-XL) 50 mg, Oral, Daily    montelukast (SINGULAIR) 10 MG tablet     Progesterone (PROMETRIUM) 150 mg, Daily    Triamcinolone Acetonide (NASACORT) 55 MCG/ACT nasal inhaler 2 sprays, Once As Needed         Review of Systems   She otherwise denies lightheadedness, dizziness, fainting, passing out, headache, nausea, vomiting, chest pain, palpitations, shortness of breath, coughing, wheezing, heart burn, reflux, skin lesions, unintended weight loss/gain, sensitivity to heat/cold, changes in sensation, changes in vision/hearing/smell/taste, myalgias, arthralgias, and has no other acute complaints or concerning symptoms to report at this time.      Physical Examination     Vitals:    01/21/25 0853   BP: 102/60   BP Location: Left arm   Patient Position: Sitting   Cuff Size: Adult   Pulse: 94   SpO2: 99%   Weight: 69.4 kg (153 lb)   Height: 162.6 cm (64\")       Estimated body mass index is 26.26 kg/m² as calculated from " "the following:    Height as of this encounter: 162.6 cm (64\").    Weight as of this encounter: 69.4 kg (153 lb).  PREVIOUS WEIGHTS:   Wt Readings from Last 5 Encounters:   01/21/25 69.4 kg (153 lb)   12/13/24 69.4 kg (153 lb)   11/15/24 69.4 kg (153 lb)   11/06/24 69.4 kg (153 lb)   08/05/24 73.9 kg (163 lb)       Physical Examination:  Gen: awake, alert, sitting up in chair in no acute distress  HEENT: NCAT, EOMI, anicteric sclerae  CV: RRR, normotensive  Resp: nonlabored on room air, sats appropriate  Rectal:   External: No external hemorrhoids, no external lesions,  No tenderness to palpation of the anal verge   RAUL: No stool or blood in the rectal vault, good tone, no palpable masses or lesions   *Please refer to anoscopy report for further details.*  MSK: moves all four, no c/c/e  Neuro: no focal deficits, cranial nerves grossly intact  Psy:  appropriate, cooperative      Data Review     Labs:  CBC  Lab Results   Component Value Date    WBC 6.3 03/05/2024    HGB 13.0 03/05/2024    HCT 39.2 03/05/2024     03/05/2024      BMP  Lab Results   Component Value Date     12/13/2022    K 4.0 12/13/2022     12/13/2022    CO2 24.0 12/13/2022    BUN 12 12/13/2022    CREATININE 0.60 07/21/2023    GLUCOSE 118 (H) 12/13/2022    CALCIUM 9.5 12/13/2022    MG 2.0 12/13/2022      LFTs  Lab Results   Component Value Date    PROTEINTOT 7.7 12/13/2022    ALBUMIN 4.90 12/13/2022    BILITOT <0.2 12/13/2022    ALT 16 12/13/2022    AST 19 12/13/2022    ALKPHOS 7 (L) 12/13/2022      Coag  No results found for: \"PROTIME\", \"PTT\", \"INR\"   Cardiac markers  Lab Results   Component Value Date    TROPONINT <0.010 12/13/2022      Iron Studies  No results found for: \"IRON\", \"TRANSFERRIN\"       Urine:  UA  Lab Results   Component Value Date    COLORU Yellow 12/13/2022    CLARITYU Clear 12/13/2022    SPECGRAVUR 1.011 12/13/2022    PHUR 6.5 12/13/2022    PROTEINUA Negative 12/13/2022    GLUCOSEU Negative 12/13/2022    KETONESU " Negative 12/13/2022    BILIRUBINUR Negative 12/13/2022    BLOODU Negative 12/13/2022    NITRITEU Negative 12/13/2022    LEUKOCYTESUR Negative 12/13/2022    UROBILINOGEN 0.2 E.U./dL 12/13/2022      UDS  Lab Results   Component Value Date    LABOPIASCN Negative 12/13/2022    AMPHETSCREEN Negative 12/13/2022    BARBITSCNUR Negative 12/13/2022    LABBENZSCN Negative 12/13/2022    COCAINEUR Negative 12/13/2022    LABMETHSCN Negative 12/13/2022    PCPUR Negative 12/13/2022    THCURSCR Negative 12/13/2022    METAMPSCNUR Negative 12/13/2022        Problem List     Patient Active Problem List   Diagnosis    Sterilization consult    Internal hemorrhoid            Assessment/Plan   Kaley Huff is a 34 y.o. female with symptomatic hemorrhoids.  Her left lateral column appears improved in size from last time, but is still somewhat enlarged, and she feels that is still causing her problems.  I attempted to band this in clinic today, but she was unable to tolerate due to pain.  However, she does not want to proceed with excisional hemorrhoidectomy at this time, given that her symptoms are improved.  She would like further follow-up, so we will see her in 2 months in clinic for repeat examination.       Smoking cessation: Never smoker  BMI: BMI is less than 30. Counseling is not indicated.    Med rec complete: yes  VTE: VTE PPX is not indicated.    Time Spent: I spent 20 minutes caring for Kaley on this date of service. This time includes time spent by me, excluding procedures, in the following activities: preparing for the clinic visit, reviewing tests, obtaining and/or reviewing a separately obtained history, performing a medically appropriate examination and/or evaluation, counseling and educating the patient/family/caregiver, ordering medications, tests, or procedures, and documenting information in the medical record.     Homero Engle MD  1/21/2025   11:12 CST

## 2025-01-21 ENCOUNTER — OFFICE VISIT (OUTPATIENT)
Dept: SURGERY | Facility: CLINIC | Age: 35
End: 2025-01-21
Payer: COMMERCIAL

## 2025-01-21 VITALS
DIASTOLIC BLOOD PRESSURE: 60 MMHG | SYSTOLIC BLOOD PRESSURE: 102 MMHG | WEIGHT: 153 LBS | OXYGEN SATURATION: 99 % | BODY MASS INDEX: 26.12 KG/M2 | HEART RATE: 94 BPM | HEIGHT: 64 IN

## 2025-01-21 DIAGNOSIS — K64.8 INTERNAL HEMORRHOID: Primary | ICD-10-CM

## 2025-01-21 NOTE — PROGRESS NOTES
General Surgery Anoscopy Procedure Note    Indication: Hemorrhoids    Procedure: The patient was placed in the left lateral decubitus position.  The anoscope was gently inserted and the bowel was inspected.  Visualization was good.  Mucosa was generally pink and healthy.  Anoscopy revealed mild to moderately enlarged left lateral column, I attempted to place a rubber band on this, patient with significant pain upon grasping; was unable to tolerate banding.  Procedure aborted.  Will see her back in 2 months for repeat examination.      Homero Engle MD  1/21/2025   09:29 CST

## 2025-05-02 ENCOUNTER — NURSE TRIAGE (OUTPATIENT)
Dept: CALL CENTER | Facility: HOSPITAL | Age: 35
End: 2025-05-02

## 2025-05-02 ENCOUNTER — APPOINTMENT (OUTPATIENT)
Dept: GENERAL RADIOLOGY | Facility: HOSPITAL | Age: 35
End: 2025-05-02

## 2025-05-02 PROCEDURE — 71046 X-RAY EXAM CHEST 2 VIEWS: CPT

## 2025-05-02 NOTE — TELEPHONE ENCOUNTER
"Reason for Disposition   [1] Patient cleared the FB spontaneously BUT [2] continues to have coughing, hoarseness, or wheezing > 30 minutes    Additional Information   Negative: [1] Choking or struggling to breathe now AND [2] lasts > 60 seconds   Negative: Can't cough, speak, or make any noise now (i.e., stops breathing)   Negative: Has passed out or is limp.   Negative: Bluish (or gray) lips or face now   Negative: Sounds like a life-threatening emergency to the triager   Negative: [1] Recovered from choking AND [2] may have swallowed a FB (foreign body)    Answer Assessment - Initial Assessment Questions  1. SUBSTANCE: \"What did the patient choke on?\"       broccoli  2. SIZE: If the object was solid, ask: \"How big was it?\"       small  3. ONSET: \"When did it begin?\" (In minutes)       45 min ago  4. RESPIRATORY DISTRESS: \"Describe the patient's breathing.\", \"Is he/she able to talk?\"      able  5. PREGNANCY: \"Is there any chance you are pregnant?\" \"When was your last menstrual period?\"      no    Protocols used: Choking - Inhaled Foreign Body-ADULT-AH    "

## 2025-05-02 NOTE — TELEPHONE ENCOUNTER
Caller was talking and eating at the same time.  She suddenly started coughing.  Did not think she aspirated her broccoli but she continues to cough 45 min later.  She is able to talk and swallow.  Keeps coughing during call.  She used her inhalers and taken a hot shower to try to get up whatever she aspirated without success.

## 2025-05-10 ENCOUNTER — OFFICE VISIT (OUTPATIENT)
Age: 35
End: 2025-05-10

## 2025-05-10 VITALS
HEIGHT: 64 IN | RESPIRATION RATE: 20 BRPM | OXYGEN SATURATION: 98 % | BODY MASS INDEX: 26.8 KG/M2 | TEMPERATURE: 97.8 F | HEART RATE: 88 BPM | WEIGHT: 157 LBS | DIASTOLIC BLOOD PRESSURE: 66 MMHG | SYSTOLIC BLOOD PRESSURE: 112 MMHG

## 2025-05-10 DIAGNOSIS — J98.8 RESPIRATORY INFECTION: Primary | ICD-10-CM

## 2025-05-10 RX ORDER — EPINEPHRINE 0.3 MG/.3ML
0.3 INJECTION SUBCUTANEOUS
COMMUNITY
Start: 2024-08-02

## 2025-05-10 RX ORDER — PROGESTERONE 100 MG/1
150 CAPSULE ORAL DAILY
COMMUNITY

## 2025-05-10 RX ORDER — GUAIFENESIN AND DEXTROMETHORPHAN HYDROBROMIDE 600; 30 MG/1; MG/1
1 TABLET, EXTENDED RELEASE ORAL EVERY 12 HOURS PRN
Qty: 20 TABLET | Refills: 0 | Status: SHIPPED | OUTPATIENT
Start: 2025-05-10 | End: 2025-05-20

## 2025-05-10 RX ORDER — LEVALBUTEROL TARTRATE 45 UG/1
AEROSOL, METERED ORAL
COMMUNITY

## 2025-05-10 RX ORDER — DOXYCYCLINE HYCLATE 100 MG
100 TABLET ORAL 2 TIMES DAILY
Qty: 14 TABLET | Refills: 0 | Status: SHIPPED | OUTPATIENT
Start: 2025-05-10 | End: 2025-05-17

## 2025-05-10 ASSESSMENT — ENCOUNTER SYMPTOMS
WHEEZING: 1
SORE THROAT: 0
SINUS PAIN: 0
SINUS PRESSURE: 0
COUGH: 1
SHORTNESS OF BREATH: 0

## 2025-05-10 NOTE — PROGRESS NOTES
[address] [place] [address] Drive   [address] [facility], [address]     Phone:  [phone]  Fax:  [phone]      Guillermina Beard is a 35 y.o. female who presents today for her medical conditions/complaints as noted below.  Guillermina Beard is c/o of Pharyngitis (Pt choked on a piece of broccoli last Friday. Seen at [facility] had negative chest xray. Pt noticed a couple of days ago she started coughing up little phlegm. Worse this morning. )      Chief Complaint   Patient presents with    Pharyngitis     Pt choked on a piece of broccoli last Friday. Seen at [facility] had negative chest xray. Pt noticed a couple of days ago she started coughing up little phlegm. Worse this morning.        HPI:     HPI    Guillermina Beard presents today for right sided chest congestion. She states she aspirated on a piece of broccoli 8 days ago. She did have a negative chest xray at [facility]. She has now developed increased productive cough, and congestion. She does have a history of asthma and bronchitis. She has been afebrile.     Tx: albuterol inhaler    Hx: asthma    No antbx in the last 90 days    Past Medical History:   Diagnosis Date    Hypertension     Sinus tachycardia         Past Surgical History:   Procedure Laterality Date     SECTION  [id number]    X's 2       Social History     Tobacco Use    Smoking status: Never    Smokeless tobacco: Never   Substance Use Topics    Alcohol use: No        Current Outpatient Medications   Medication Sig Dispense Refill    progesterone (PROMETRIUM) 100 MG CAPS capsule Take 150 mg by mouth daily      levalbuterol (XOPENEX HFA) 45 MCG/ACT inhaler INHALE 2 PUFFS INTO LUNGS EVERY 4 HOURS AS NEEDED FOR WHEEZING OR SHORTNESS OF BREATH      EPINEPHrine (EPIPEN) 0.3 MG/0.3ML SOAJ injection 0.3 mLs      PROGESTERONE PO Take 150 mg by mouth daily      dextromethorphan-guaiFENesin (MUCINEX DM)  MG per extended release tablet Take 1 tablet by mouth every 12 hours as needed for Cough or

## 2025-05-12 RX ORDER — METOPROLOL SUCCINATE 50 MG/1
50 TABLET, EXTENDED RELEASE ORAL DAILY
Qty: 90 TABLET | Refills: 3 | Status: SHIPPED | OUTPATIENT
Start: 2025-05-12

## 2025-06-09 ENCOUNTER — TELEPHONE (OUTPATIENT)
Dept: CARDIOLOGY | Facility: CLINIC | Age: 35
End: 2025-06-09
Payer: OTHER MISCELLANEOUS

## 2025-06-09 NOTE — TELEPHONE ENCOUNTER
Caller: Kaley Huff    Relationship: Self    Best call back number: [phone]    Which medication are you concerned about: METOPROLOL    Who prescribed you this medication: DR. SORIANO    What are your concerns: IT IS MESSING WITH HER ASTHMA

## 2025-06-10 RX ORDER — ATENOLOL 25 MG/1
25 TABLET ORAL DAILY
Qty: 30 TABLET | Refills: 11 | Status: SHIPPED | OUTPATIENT
Start: 2025-06-10 | End: 2025-06-16 | Stop reason: SDUPTHER

## 2025-06-16 RX ORDER — BISOPROLOL FUMARATE 5 MG/1
5 TABLET, FILM COATED ORAL DAILY
Qty: 30 TABLET | Refills: 11 | Status: SHIPPED | OUTPATIENT
Start: 2025-06-16

## 2025-06-16 RX ORDER — ATENOLOL 50 MG/1
50 TABLET ORAL DAILY
Qty: 30 TABLET | Refills: 11 | Status: SHIPPED | OUTPATIENT
Start: 2025-06-16 | End: 2025-06-16

## 2025-06-26 RX ORDER — BISOPROLOL FUMARATE 10 MG/1
10 TABLET, FILM COATED ORAL DAILY
Qty: 30 TABLET | Refills: 11 | Status: SHIPPED | OUTPATIENT
Start: 2025-06-26

## 2025-06-26 RX ORDER — METOPROLOL SUCCINATE 50 MG/1
TABLET, EXTENDED RELEASE ORAL
Qty: 135 TABLET | Refills: 3 | Status: SHIPPED | OUTPATIENT
Start: 2025-06-26

## 2025-07-18 ENCOUNTER — OFFICE VISIT (OUTPATIENT)
Dept: ENT CLINIC | Age: 35
End: 2025-07-18

## 2025-07-18 VITALS
WEIGHT: 159 LBS | HEIGHT: 64 IN | DIASTOLIC BLOOD PRESSURE: 74 MMHG | BODY MASS INDEX: 27.14 KG/M2 | SYSTOLIC BLOOD PRESSURE: 120 MMHG

## 2025-07-18 DIAGNOSIS — R22.1 NECK SWELLING: ICD-10-CM

## 2025-07-18 DIAGNOSIS — R07.0 THROAT PAIN: Primary | ICD-10-CM

## 2025-07-18 RX ORDER — BISOPROLOL FUMARATE 5 MG/1
5 TABLET, FILM COATED ORAL DAILY
COMMUNITY
Start: 2025-07-08

## 2025-07-18 RX ORDER — LEVOFLOXACIN 500 MG/1
500 TABLET, FILM COATED ORAL DAILY
Qty: 10 TABLET | Refills: 0 | Status: SHIPPED | OUTPATIENT
Start: 2025-07-18 | End: 2025-07-28

## 2025-07-18 RX ORDER — PREDNISONE 20 MG/1
TABLET ORAL
Qty: 21 TABLET | Refills: 0 | Status: SHIPPED | OUTPATIENT
Start: 2025-07-18

## 2025-07-18 ASSESSMENT — ENCOUNTER SYMPTOMS
GASTROINTESTINAL NEGATIVE: 1
RESPIRATORY NEGATIVE: 1
ALLERGIC/IMMUNOLOGIC NEGATIVE: 1
EYES NEGATIVE: 1
SORE THROAT: 1

## 2025-07-18 NOTE — PROGRESS NOTES
2025    Guillermina Beard (:  1990) is a 35 y.o. female, Established patient, here for evaluation of the following chief complaint(s):  New Patient (Lymph node )      Vitals:    25 0950   BP: 120/74   Weight: 72.1 kg (159 lb)   Height: 1.626 m (5' 4\")       Wt Readings from Last 3 Encounters:   25 72.1 kg (159 lb)   05/10/25 71.2 kg (157 lb)   24 80.7 kg (178 lb)       BP Readings from Last 3 Encounters:   25 120/74   05/10/25 112/66   24 128/80         SUBJECTIVE/OBJECTIVE:    Patient seen today for enlarged lymph noes. She reports that in  she had throat infection that caused swallowing issues and had to see an ENT in [place] and they told her she had an abscess in her tonsil. She had her tonsils removed and this relieved her dysphagia. She reports she hemorrhaged twice after her tonsillectomy and was given clindamycin after that. She reports that she still has issues with the right side of her throat. She had a root canal on the right side as well a few years ago. She reports that about 2 weeks ago she had a filling done on the tooth in front of the one she had a root canal on. She reports that her tooth that she had the root canal on became infected and she developed swelling in the submandibular region. She finished Clindamycin yesterday and medrol dose pack a couple days ago. She then went to an endodontist this past Tuesday and told her that he didn't think it was her tooth. She complains of intermittent right ear pain, enlarged lymph nodes in the right submandibular region, and right sided pressure in her throat. She reports that her symptoms have improved greatly with the clindamycin and steroid. She denies sore throat or difficulty swallowing. She reports that she has scar tissue in her throat and things in her throat have not felt normal since her tonsil surgery.         Review of Systems   Constitutional: Negative.    HENT:  Positive for ear pain

## 2025-08-04 ENCOUNTER — TELEPHONE (OUTPATIENT)
Dept: ENT CLINIC | Age: 35
End: 2025-08-04

## 2025-08-11 ENCOUNTER — OFFICE VISIT (OUTPATIENT)
Dept: ENT CLINIC | Age: 35
End: 2025-08-11

## 2025-08-11 VITALS
BODY MASS INDEX: 27.14 KG/M2 | HEIGHT: 64 IN | DIASTOLIC BLOOD PRESSURE: 80 MMHG | WEIGHT: 159 LBS | SYSTOLIC BLOOD PRESSURE: 115 MMHG

## 2025-08-11 DIAGNOSIS — R07.0 THROAT PAIN: Primary | ICD-10-CM

## 2025-08-11 DIAGNOSIS — M26.609 TMJ DYSFUNCTION: ICD-10-CM

## 2025-08-11 DIAGNOSIS — R09.A2 GLOBUS SENSATION: ICD-10-CM

## 2025-08-11 DIAGNOSIS — R13.10 DYSPHAGIA, UNSPECIFIED TYPE: ICD-10-CM

## 2025-08-11 DIAGNOSIS — K21.9 LARYNGOPHARYNGEAL REFLUX: ICD-10-CM

## 2025-08-11 PROCEDURE — 99214 OFFICE O/P EST MOD 30 MIN: CPT | Performed by: NURSE PRACTITIONER

## 2025-08-11 PROCEDURE — 31575 DIAGNOSTIC LARYNGOSCOPY: CPT | Performed by: NURSE PRACTITIONER

## 2025-08-11 RX ORDER — MELOXICAM 7.5 MG/1
7.5 TABLET ORAL DAILY PRN
Qty: 30 TABLET | Refills: 1 | Status: SHIPPED | OUTPATIENT
Start: 2025-08-11

## 2025-08-11 RX ORDER — FAMOTIDINE 20 MG/1
20 TABLET, FILM COATED ORAL NIGHTLY
Qty: 30 TABLET | Refills: 1 | Status: SHIPPED | OUTPATIENT
Start: 2025-08-11

## 2025-08-11 ASSESSMENT — ENCOUNTER SYMPTOMS
RESPIRATORY NEGATIVE: 1
SORE THROAT: 1
EYES NEGATIVE: 1
TROUBLE SWALLOWING: 1
ALLERGIC/IMMUNOLOGIC NEGATIVE: 1
GASTROINTESTINAL NEGATIVE: 1

## 2025-08-12 DIAGNOSIS — R07.0 THROAT PAIN: Primary | ICD-10-CM

## 2025-08-12 RX ORDER — CLINDAMYCIN HYDROCHLORIDE 300 MG/1
300 CAPSULE ORAL 3 TIMES DAILY
Qty: 30 CAPSULE | Refills: 0 | Status: SHIPPED | OUTPATIENT
Start: 2025-08-12 | End: 2025-08-12 | Stop reason: SINTOL

## 2025-08-12 RX ORDER — PREDNISONE 20 MG/1
TABLET ORAL
Qty: 21 TABLET | Refills: 0 | Status: SHIPPED | OUTPATIENT
Start: 2025-08-12

## 2025-08-12 RX ORDER — LEVOFLOXACIN 500 MG/1
500 TABLET, FILM COATED ORAL DAILY
Qty: 10 TABLET | Refills: 0 | Status: SHIPPED | OUTPATIENT
Start: 2025-08-12 | End: 2025-08-22

## (undated) DEVICE — APPL HEMO ENDO SURGICEL 2IN1 1P/U STRL

## (undated) DEVICE — ADHS LIQ MASTISOL 2/3ML

## (undated) DEVICE — ENDOPATH XCEL WITH OPTIVIEW TECHNOLOGY UNIVERSAL TROCAR STABILITY SLEEVES: Brand: ENDOPATH XCEL OPTIVIEW

## (undated) DEVICE — GLV SURG SENSICARE W/ALOE PF LF 7.5 STRL

## (undated) DEVICE — ENDOPATH XCEL WITH OPTIVIEW TECHNOLOGY BLADELESS TROCARS WITH STABILITY SLEEVES: Brand: ENDOPATH XCEL OPTIVIEW

## (undated) DEVICE — GLV SURG TRIUMPH MICRO PF LTX 8 STRL

## (undated) DEVICE — CLTH CLENS READYCLEANSE PERI CARE PK/5

## (undated) DEVICE — SPNG GZ 2S 2X2 8PLY STRL PK/2

## (undated) DEVICE — STRIP CLS WND CURAD MEDI/STRIP HYPOALLERG 0.25X4IN PK/10

## (undated) DEVICE — BAPTIST TURNOVER KIT: Brand: MEDLINE INDUSTRIES, INC.

## (undated) DEVICE — HARMONIC ACE +7 LAPAROSCOPIC SHEARS ADVANCED HEMOSTASIS 5MM DIAMETER 36CM SHAFT LENGTH  FOR USE WITH GRAY HAND PIECE ONLY: Brand: HARMONIC ACE

## (undated) DEVICE — ENDOPATH XCEL BLADELESS TROCARS WITH STABILITY SLEEVES: Brand: ENDOPATH XCEL

## (undated) DEVICE — UTILITY MARKER W/MED LABELS: Brand: MEDLINE

## (undated) DEVICE — ST TBG PNEUMOCLEAR EVAC SMOKE HIFLO

## (undated) DEVICE — SUT MNCRYL 4/0 PS2 27IN UD MCP426H

## (undated) DEVICE — DRSNG SURESITE WNDW 2.38X2.75

## (undated) DEVICE — MONOPOLAR METZENBAUM SCISSOR, MINI BLADE TIP, DISPOSABLE: Brand: MONOPOLAR METZENBAUM SCISSOR, MINI BLADE TIP, DISPOSABLE

## (undated) DEVICE — PK LAP GYN 30